# Patient Record
Sex: FEMALE | Race: OTHER | Employment: UNEMPLOYED | URBAN - METROPOLITAN AREA
[De-identification: names, ages, dates, MRNs, and addresses within clinical notes are randomized per-mention and may not be internally consistent; named-entity substitution may affect disease eponyms.]

---

## 2021-11-05 ENCOUNTER — HOSPITAL ENCOUNTER (OUTPATIENT)
Dept: PREADMISSION TESTING | Age: 31
Discharge: HOME | End: 2021-11-05
Payer: MEDICAID

## 2021-11-05 VITALS
RESPIRATION RATE: 16 BRPM | HEIGHT: 62 IN | OXYGEN SATURATION: 98 % | HEART RATE: 78 BPM | TEMPERATURE: 97.7 F | WEIGHT: 238.76 LBS | SYSTOLIC BLOOD PRESSURE: 122 MMHG | DIASTOLIC BLOOD PRESSURE: 56 MMHG | BODY MASS INDEX: 43.94 KG/M2

## 2021-11-05 LAB
ABO + RH BLD: NORMAL
BASOPHILS # BLD: 0.1 K/UL (ref 0–0.1)
BASOPHILS NFR BLD: 1 % (ref 0–1)
BLOOD GROUP ANTIBODIES SERPL: NORMAL
DIFFERENTIAL METHOD BLD: ABNORMAL
EOSINOPHIL # BLD: 0.3 K/UL (ref 0–0.4)
EOSINOPHIL NFR BLD: 4 % (ref 0–7)
ERYTHROCYTE [DISTWIDTH] IN BLOOD BY AUTOMATED COUNT: 17.6 % (ref 11.5–14.5)
HCG SERPL QL: NEGATIVE
HCT VFR BLD AUTO: 38.4 % (ref 35–47)
HGB BLD-MCNC: 11.6 G/DL (ref 11.5–16)
IMM GRANULOCYTES # BLD AUTO: 0 K/UL (ref 0–0.04)
IMM GRANULOCYTES NFR BLD AUTO: 0 % (ref 0–0.5)
LYMPHOCYTES # BLD: 1.9 K/UL (ref 0.8–3.5)
LYMPHOCYTES NFR BLD: 26 % (ref 12–49)
MCH RBC QN AUTO: 24.2 PG (ref 26–34)
MCHC RBC AUTO-ENTMCNC: 30.2 G/DL (ref 30–36.5)
MCV RBC AUTO: 80.2 FL (ref 80–99)
MONOCYTES # BLD: 0.3 K/UL (ref 0–1)
MONOCYTES NFR BLD: 4 % (ref 5–13)
NEUTS SEG # BLD: 4.9 K/UL (ref 1.8–8)
NEUTS SEG NFR BLD: 65 % (ref 32–75)
NRBC # BLD: 0 K/UL (ref 0–0.01)
NRBC BLD-RTO: 0 PER 100 WBC
PLATELET # BLD AUTO: 357 K/UL (ref 150–400)
PMV BLD AUTO: 11.1 FL (ref 8.9–12.9)
RBC # BLD AUTO: 4.79 M/UL (ref 3.8–5.2)
SPECIMEN EXP DATE BLD: NORMAL
WBC # BLD AUTO: 7.5 K/UL (ref 3.6–11)

## 2021-11-05 PROCEDURE — 84703 CHORIONIC GONADOTROPIN ASSAY: CPT

## 2021-11-05 PROCEDURE — 85025 COMPLETE CBC W/AUTO DIFF WBC: CPT

## 2021-11-05 PROCEDURE — 36415 COLL VENOUS BLD VENIPUNCTURE: CPT

## 2021-11-05 PROCEDURE — U0005 INFEC AGEN DETEC AMPLI PROBE: HCPCS

## 2021-11-05 PROCEDURE — 86901 BLOOD TYPING SEROLOGIC RH(D): CPT

## 2021-11-05 RX ORDER — ACETAMINOPHEN 325 MG/1
650 TABLET ORAL
COMMUNITY

## 2021-11-05 NOTE — PERIOP NOTES
S/W Anesthesiologist Dr. Yvonne Sharif to advise patient stating she has 2 \"permanent dermal piercings\" on her face (one stud on each side around dimple area). Per patient the studs can only be removed surgically.          S/W Coreen Stalls at Surgical [facility] to notify of patient allergy to Latex

## 2021-11-05 NOTE — PERIOP NOTES
[address], [address] [facility]   MAIN OR                                  [phone]   MAIN PRE OP                          [phone]                                                                                AMBULATORY PRE OP          [phone]  PRE-ADMISSION TESTING    [phone]   Surgery Date: Thursday November 11, 2021        Is surgery arrival time given by surgeon? YES  NO  If NO, [facility] staff will call you between 3 and 7pm the day before your surgery with your arrival time. (If your surgery is on a Monday, we will call you the Friday before.)    Call [phone] after 7pm Monday-Friday if you did not receive this call. INSTRUCTIONS BEFORE YOUR SURGERY   When You  Arrive Arrive at the [address] [facility] on the day of your surgery  Have your insurance card, photo ID, and any copayment (if needed)   Food   and   Drink NO food or drink after midnight the night before surgery    This means NO water, gum, mints, coffee, juice, etc.  No alcohol (beer, wine, liquor) 24 hours before and after surgery   Medications to   TAKE   Morning of Surgery MEDICATIONS TO TAKE THE MORNING OF SURGERY WITH A SIP OF WATER:    Tylenol if needed   Albuterol inhaler if needed and bring to hospital   Medications  To  STOP      7 days before surgery  Non-Steroidal anti-inflammatory Drugs (NSAID's): for example, Ibuprofen (Advil, Motrin), Naproxen (Aleve)   Aspirin, if taking for pain    Herbal supplements, vitamins, and fish oil   Other: It Works  (Pain medications not listed above, including Tylenol may be taken)   Blood  Thinners  Not applicable.    Bathing Clothing  Jewelry  Valuables      If you shower the morning of surgery, please do not apply anything to your skin (lotions, powders, deodorant, or makeup, especially mascara)   Do not shave or trim anywhere 24 hours before surgery   Wear your hair loose or down; no pony-tails, buns, or metal hair clips   Wear loose, comfortable, clean clothes   Leave money, valuables, and jewelry, including body piercings, at home     Going Home - or Spending the Night  SAME-DAY SURGERY: You must have a responsible adult drive you home and stay with you 24 hours after surgery   Special Instructions COVID test 11/11/2021- You will be notified only if results are positive. Follow all instructions so your surgery wont be cancelled. Please, be on time. If a situation occurs and you are delayed the day of surgery, call  [phone]. If your physical condition changes (like a fever, cold, flu, etc.) call your surgeon. Home medication(s) reviewed and verified via   LIST   VERBAL   during PAT appointment. The patient was contacted by   IN-PERSON  The patient verbalizes understanding of all instructions and   DOES NOT   need reinforcement.

## 2021-11-07 LAB
SARS-COV-2, XPLCVT: NOT DETECTED
SOURCE, COVRS: NORMAL

## 2021-11-10 ENCOUNTER — ANESTHESIA EVENT (OUTPATIENT)
Dept: SURGERY | Age: 31
End: 2021-11-10
Payer: MEDICAID

## 2021-11-11 ENCOUNTER — HOSPITAL ENCOUNTER (OUTPATIENT)
Age: 31
Setting detail: OUTPATIENT SURGERY
Discharge: HOME | End: 2021-11-11
Attending: STUDENT IN AN ORGANIZED HEALTH CARE EDUCATION/TRAINING PROGRAM | Admitting: STUDENT IN AN ORGANIZED HEALTH CARE EDUCATION/TRAINING PROGRAM
Payer: MEDICAID

## 2021-11-11 ENCOUNTER — ANESTHESIA (OUTPATIENT)
Dept: SURGERY | Age: 31
End: 2021-11-11
Payer: MEDICAID

## 2021-11-11 VITALS
BODY MASS INDEX: 45.03 KG/M2 | OXYGEN SATURATION: 91 % | WEIGHT: 244.71 LBS | SYSTOLIC BLOOD PRESSURE: 107 MMHG | HEIGHT: 62 IN | HEART RATE: 71 BPM | RESPIRATION RATE: 16 BRPM | DIASTOLIC BLOOD PRESSURE: 67 MMHG | TEMPERATURE: 97.5 F

## 2021-11-11 LAB — HCG UR QL: NEGATIVE

## 2021-11-11 PROCEDURE — 76210000034 HC AMBSU PH I REC 0.5 TO 1 HR: Performed by: STUDENT IN AN ORGANIZED HEALTH CARE EDUCATION/TRAINING PROGRAM

## 2021-11-11 PROCEDURE — 2709999900 HC NON-CHARGEABLE SUPPLY: Performed by: STUDENT IN AN ORGANIZED HEALTH CARE EDUCATION/TRAINING PROGRAM

## 2021-11-11 PROCEDURE — 88307 TISSUE EXAM BY PATHOLOGIST: CPT

## 2021-11-11 PROCEDURE — 74011250636 HC RX REV CODE- 250/636: Performed by: NURSE ANESTHETIST, CERTIFIED REGISTERED

## 2021-11-11 PROCEDURE — 77030040361 HC SLV COMPR DVT MDII -B

## 2021-11-11 PROCEDURE — 77030003666 HC NDL SPINAL BD -A: Performed by: STUDENT IN AN ORGANIZED HEALTH CARE EDUCATION/TRAINING PROGRAM

## 2021-11-11 PROCEDURE — 77030007304 HC ELECTRD LP RND MEGA -A: Performed by: STUDENT IN AN ORGANIZED HEALTH CARE EDUCATION/TRAINING PROGRAM

## 2021-11-11 PROCEDURE — 77030040922 HC BLNKT HYPOTHRM STRY -A

## 2021-11-11 PROCEDURE — 88305 TISSUE EXAM BY PATHOLOGIST: CPT

## 2021-11-11 PROCEDURE — 76030000001 HC AMB SURG OR TIME 1 TO 1.5: Performed by: STUDENT IN AN ORGANIZED HEALTH CARE EDUCATION/TRAINING PROGRAM

## 2021-11-11 PROCEDURE — 74011000250 HC RX REV CODE- 250: Performed by: STUDENT IN AN ORGANIZED HEALTH CARE EDUCATION/TRAINING PROGRAM

## 2021-11-11 PROCEDURE — 77030018722 HC ELCTRD BALL LEEP UTMD -B: Performed by: STUDENT IN AN ORGANIZED HEALTH CARE EDUCATION/TRAINING PROGRAM

## 2021-11-11 PROCEDURE — 81025 URINE PREGNANCY TEST: CPT

## 2021-11-11 PROCEDURE — 76210000050 HC AMBSU PH II REC 0.5 TO 1 HR: Performed by: STUDENT IN AN ORGANIZED HEALTH CARE EDUCATION/TRAINING PROGRAM

## 2021-11-11 PROCEDURE — 76060000062 HC AMB SURG ANES 1 TO 1.5 HR: Performed by: STUDENT IN AN ORGANIZED HEALTH CARE EDUCATION/TRAINING PROGRAM

## 2021-11-11 RX ORDER — FENTANYL CITRATE 50 UG/ML
INJECTION, SOLUTION INTRAMUSCULAR; INTRAVENOUS AS NEEDED
Status: DISCONTINUED | OUTPATIENT
Start: 2021-11-11 | End: 2021-11-11 | Stop reason: HOSPADM

## 2021-11-11 RX ORDER — ONDANSETRON 2 MG/ML
4 INJECTION INTRAMUSCULAR; INTRAVENOUS AS NEEDED
Status: DISCONTINUED | OUTPATIENT
Start: 2021-11-11 | End: 2021-11-11 | Stop reason: HOSPADM

## 2021-11-11 RX ORDER — SODIUM CHLORIDE, SODIUM LACTATE, POTASSIUM CHLORIDE, CALCIUM CHLORIDE 600; 310; 30; 20 MG/100ML; MG/100ML; MG/100ML; MG/100ML
INJECTION, SOLUTION INTRAVENOUS
Status: DISCONTINUED | OUTPATIENT
Start: 2021-11-11 | End: 2021-11-11 | Stop reason: HOSPADM

## 2021-11-11 RX ORDER — PROPOFOL 10 MG/ML
INJECTION, EMULSION INTRAVENOUS
Status: DISCONTINUED | OUTPATIENT
Start: 2021-11-11 | End: 2021-11-11 | Stop reason: HOSPADM

## 2021-11-11 RX ORDER — SODIUM CHLORIDE, SODIUM LACTATE, POTASSIUM CHLORIDE, CALCIUM CHLORIDE 600; 310; 30; 20 MG/100ML; MG/100ML; MG/100ML; MG/100ML
125 INJECTION, SOLUTION INTRAVENOUS CONTINUOUS
Status: DISCONTINUED | OUTPATIENT
Start: 2021-11-11 | End: 2021-11-11 | Stop reason: HOSPADM

## 2021-11-11 RX ORDER — LIDOCAINE HYDROCHLORIDE 10 MG/ML
0.1 INJECTION, SOLUTION EPIDURAL; INFILTRATION; INTRACAUDAL; PERINEURAL AS NEEDED
Status: DISCONTINUED | OUTPATIENT
Start: 2021-11-11 | End: 2021-11-11 | Stop reason: HOSPADM

## 2021-11-11 RX ORDER — PHENYLEPHRINE HYDROCHLORIDE 10 MG/ML
INJECTION INTRAVENOUS AS NEEDED
Status: DISCONTINUED | OUTPATIENT
Start: 2021-11-11 | End: 2021-11-11 | Stop reason: HOSPADM

## 2021-11-11 RX ORDER — MIDAZOLAM HYDROCHLORIDE 1 MG/ML
INJECTION, SOLUTION INTRAMUSCULAR; INTRAVENOUS AS NEEDED
Status: DISCONTINUED | OUTPATIENT
Start: 2021-11-11 | End: 2021-11-11 | Stop reason: HOSPADM

## 2021-11-11 RX ORDER — FERRIC SUBSULFATE 20-22G/100
SOLUTION, NON-ORAL MISCELLANEOUS AS NEEDED
Status: DISCONTINUED | OUTPATIENT
Start: 2021-11-11 | End: 2021-11-11 | Stop reason: HOSPADM

## 2021-11-11 RX ORDER — HYDROMORPHONE HYDROCHLORIDE 1 MG/ML
.5-1 INJECTION, SOLUTION INTRAMUSCULAR; INTRAVENOUS; SUBCUTANEOUS
Status: DISCONTINUED | OUTPATIENT
Start: 2021-11-11 | End: 2021-11-11 | Stop reason: HOSPADM

## 2021-11-11 RX ORDER — DEXAMETHASONE SODIUM PHOSPHATE 4 MG/ML
INJECTION, SOLUTION INTRA-ARTICULAR; INTRALESIONAL; INTRAMUSCULAR; INTRAVENOUS; SOFT TISSUE AS NEEDED
Status: DISCONTINUED | OUTPATIENT
Start: 2021-11-11 | End: 2021-11-11 | Stop reason: HOSPADM

## 2021-11-11 RX ADMIN — FENTANYL CITRATE 50 MCG: 50 INJECTION, SOLUTION INTRAMUSCULAR; INTRAVENOUS at 14:14

## 2021-11-11 RX ADMIN — PROPOFOL 50 MG: 10 INJECTION, EMULSION INTRAVENOUS at 15:06

## 2021-11-11 RX ADMIN — PROPOFOL 120 MCG/KG/MIN: 10 INJECTION, EMULSION INTRAVENOUS at 14:05

## 2021-11-11 RX ADMIN — SODIUM CHLORIDE, POTASSIUM CHLORIDE, SODIUM LACTATE AND CALCIUM CHLORIDE: 600; 310; 30; 20 INJECTION, SOLUTION INTRAVENOUS at 15:10

## 2021-11-11 RX ADMIN — DEXAMETHASONE SODIUM PHOSPHATE 4 MG: 4 INJECTION, SOLUTION INTRAMUSCULAR; INTRAVENOUS at 14:21

## 2021-11-11 RX ADMIN — PROPOFOL 40 MG: 10 INJECTION, EMULSION INTRAVENOUS at 14:52

## 2021-11-11 RX ADMIN — PHENYLEPHRINE HYDROCHLORIDE 100 MCG: 10 INJECTION INTRAVENOUS at 14:27

## 2021-11-11 RX ADMIN — PHENYLEPHRINE HYDROCHLORIDE 100 MCG: 10 INJECTION INTRAVENOUS at 14:57

## 2021-11-11 RX ADMIN — MIDAZOLAM 2 MG: 1 INJECTION, SOLUTION INTRAMUSCULAR; INTRAVENOUS at 13:58

## 2021-11-11 RX ADMIN — PROPOFOL 20 MG: 10 INJECTION, EMULSION INTRAVENOUS at 15:08

## 2021-11-11 RX ADMIN — FENTANYL CITRATE 150 MCG: 50 INJECTION, SOLUTION INTRAMUSCULAR; INTRAVENOUS at 13:59

## 2021-11-11 RX ADMIN — PHENYLEPHRINE HYDROCHLORIDE 100 MCG: 10 INJECTION INTRAVENOUS at 14:49

## 2021-11-11 RX ADMIN — PROPOFOL 30 MG: 10 INJECTION, EMULSION INTRAVENOUS at 15:00

## 2021-11-11 RX ADMIN — SODIUM CHLORIDE, POTASSIUM CHLORIDE, SODIUM LACTATE AND CALCIUM CHLORIDE: 600; 310; 30; 20 INJECTION, SOLUTION INTRAVENOUS at 13:58

## 2021-11-11 RX ADMIN — PROPOFOL 50 MG: 10 INJECTION, EMULSION INTRAVENOUS at 14:07

## 2021-11-11 RX ADMIN — PROPOFOL 50 MG: 10 INJECTION, EMULSION INTRAVENOUS at 15:03

## 2021-11-11 RX ADMIN — FENTANYL CITRATE 50 MCG: 50 INJECTION, SOLUTION INTRAMUSCULAR; INTRAVENOUS at 14:10

## 2021-11-11 RX ADMIN — PHENYLEPHRINE HYDROCHLORIDE 100 MCG: 10 INJECTION INTRAVENOUS at 14:38

## 2021-11-11 RX ADMIN — PROPOFOL 50 MG: 10 INJECTION, EMULSION INTRAVENOUS at 14:11

## 2021-11-11 RX ADMIN — PROPOFOL 40 MG: 10 INJECTION, EMULSION INTRAVENOUS at 14:55

## 2021-11-11 NOTE — ANESTHESIA PREPROCEDURE EVALUATION
Anesthetic History   No history of anesthetic complications            Review of Systems / Medical History  Patient summary reviewed, nursing notes reviewed and pertinent labs reviewed    Pulmonary            Asthma : well controlled       Neuro/Psych   Within defined limits           Cardiovascular  Within defined limits                Exercise tolerance: >4 METS  Comments: Not on beta blocker   GI/Hepatic/Renal  Within defined limits              Endo/Other      Hypothyroidism (no longer taking meds)  Morbid obesity     Other Findings              Physical Exam    Airway  Mallampati: II  TM Distance: 4 - 6 cm  Neck ROM: normal range of motion   Mouth opening: Normal     Cardiovascular  Regular rate and rhythm,  S1 and S2 normal,  no murmur, click, rub, or gallop  Rhythm: regular  Rate: normal         Dental  No notable dental hx       Pulmonary  Breath sounds clear to auscultation               Abdominal  GI exam deferred       Other Findings            Anesthetic Plan    ASA: 2  Anesthesia type: MAC          Induction: Intravenous  Anesthetic plan and risks discussed with: Patient

## 2021-11-11 NOTE — ANESTHESIA POSTPROCEDURE EVALUATION
Procedure(s):  LOOP ELECTRODE EXCISION PROCEDURE OF CERVIX (LEEP). MAC    Anesthesia Post Evaluation      Multimodal analgesia: multimodal analgesia used between 6 hours prior to anesthesia start to PACU discharge  Patient location during evaluation: bedside  Patient participation: complete - patient participated  Level of consciousness: awake  Pain management: adequate  Airway patency: patent  Anesthetic complications: no  Cardiovascular status: acceptable  Respiratory status: acceptable  Hydration status: acceptable        INITIAL Post-op Vital signs:   Vitals Value Taken Time   /77 11/11/21 1535   Temp 36.4 °C (97.5 °F) 11/11/21 1520   Pulse 65 11/11/21 1538   Resp 21 11/11/21 1538   SpO2 96 % 11/11/21 1538   Vitals shown include unvalidated device data.

## 2021-11-11 NOTE — DISCHARGE INSTRUCTIONS
DISCHARGE SUMMARY from your Nurse      PATIENT INSTRUCTIONS    After general anesthesia or intravenous sedation, for 24 hours or while taking prescription Narcotics:  · Limit your activities  · Do not drive and operate hazardous machinery  · Do not make important personal or business decisions  · Do  not drink alcoholic beverages  · If you have not urinated within 8 hours after discharge, please contact your surgeon on call. Report the following to your surgeon:  · Excessive pain, swelling, redness or odor of or around the surgical area  · Temperature over 100.5  · Nausea and vomiting lasting longer than 4 hours or if unable to take medications  · Any signs of decreased circulation or nerve impairment to extremity: change in color, persistent  numbness, tingling, coldness or increase pain  · Any questions      GOOD HELP TO FIGHT AN INFECTION  Here are a few tip to help reduce the chance of getting an infection after surgery:   Wash Your Hands   Good handwashing is the most important thing you and your caregiver can do.  Wash before and after caring for any wounds. Dry your hand with a clean towel.  Wash with soap and water for at least 20 seconds. A TIP: sing the \"Happy Birthday\" song through one time while washing to help with the timing.  Use a hand  in between washings.  Shower   When your surgeon says it is OK to take a shower, use a new bar of antibacterial soap (if that is what you use, and keep that bar of soap ONLY for your use), or antibacterial body wash.  Use a clean wash cloth or sponge when you bathe.  Dry off with a clean towel  after every bath - be careful around any wounds, skin staples, sutures or surgical glue over/on wounds.  Do not enter swimming pools, hot tubs, lakes, rivers and/or ocean until wounds are healed and your doctor/surgeon says it is OK.  Use Clean Sheets   Sleep on freshly laundered sheets after your surgery.    Keep the surgery site covered with a clean, dry bandage (if instructed to do so). If the bandage becomes soiled, reapply a new, dry, clean bandage.  Do not allow pets to sleep with you while your wound is healing.  Lifestyle Modification and Controlling Your Blood Sugar   Smoking slows wound healing. Stop smoking and limit exposure to second-hand smoke.  High blood sugar slows wound healing. Eat a well-balanced diet to provide proper nutrition while healing   Monitor your blood sugar (if you are a diabetic) and take your medications as you are suppose to so you can control you blood sugar after surgery. COUGH AND DEEP BREATHE    Breathing deeply and coughing are very important exercises to do after surgery. Deep breathing and coughing open the little air tubes and air sacks in your lungs. You take deep breaths every day. You may not even notice - it is just something you do when you sigh or yawn. It is a natural exercise you do to keep these air passages open. After surgery, take deep breaths and cough, on purpose. DIRECTIONS:  · Take 10 to 15 slow deep breaths every hour while awake. · Breathe in deeply, and hold it for 2 seconds. · Exhale slowly through puckered lips, like blowing up a balloon. · After every 4th or 5th deep breath, hug your pillow to your chest or belly and give a hard, deep cough. Yes, it will probably hurt. But doing this exercise is a very important part of healing after surgery. Take your pain medicine to help you do this exercise without too much pain. Coughing and deep breathing help prevent bronchitis and pneumonia after surgery. If you had chest or belly surgery, use a pillow as a \"hug jeana\" and hold it tightly to your chest or belly when you cough. ANKLE PUMPS    Ankle pumps increase the circulation of oxygenated blood to your lower extremities and decrease your risk for circulation problems such as blood clots.  They also stretch the muscles, tendons and ligaments in your foot and ankle, and prevent joint contracture in the ankle and foot, especially after surgeries on the legs. It is important to do ankle pump exercises regularly after surgery because immobility increases your risk for developing a blood clot. Your doctor may also have you take an Aspirin for the next few days as well. If your doctor did not ask you to take an Aspirin, consult with him before starting Aspirin therapy on your own. The exercise is quite simple. · Slowly point your foot forward, feeling the muscles on the top of your lower leg stretch, and hold this position for 5 seconds. · Next, pull your foot back toward you as far as possible, stretching the calf muscles, and hold that position for 5 seconds. · Repeat with the other foot. · Perform 10 repetitions every hour while awake for both ankles if possible (down and then up with the foot once is one repetition). You should feel gentle stretching of the muscles in your lower leg when doing this exercise. If you feel pain, or your range of motion is limited, don't push too hard. Only go the limit your joint and muscles will let you go. If you have increasing pain, progressively worsening leg warmth or swelling, STOP the exercise and call your doctor. These are general instructions for a healthy lifestyle:    *   Please give a list of your current medications to your Primary Care Provider. *   Please update this list whenever your medications are discontinued, doses are changed, or new medications (including over-the-counter products) are added. *   Please carry medication information at all times in case of emergency situations. About Smoking  No smoking / No tobacco products  Avoid exposure to second hand smoke     Surgeon General's Warning:  Quitting smoking now greatly reduces serious risk to your health.     Obesity, smoking, and sedentary lifestyle greatly increases your risk for illness and disease. A healthy diet, regular physical exercise & weight monitoring are important for maintaining a healthy lifestyle. Congestive Heart Failure  You may be retaining fluid if you have a history of heart failure or if you experience any of the following symptoms:  Weight gain of 3 pounds or more overnight or 5 pounds in a week, increased swelling in your hands or feet or shortness of breath while lying flat in bed. Please call your doctor as soon as you notice any of these symptoms; do not wait until your next office visit. Recognize signs and symptoms of STROKE:  F -  Face looks uneven  A -  Arms unable to move or move evenly  S -  Speech slurred or non-existent  T -  Time-call 911 as soon as signs and symptoms begin-DO NOT go          back to bed or wait to see if you get better-TIME IS BRAIN. Warning Signs of HEART ATTACK   Call 911 if you have these symptoms:     Chest discomfort. Most heart attacks involve discomfort in the center of the chest that lasts more than a few minutes, or that goes away and comes back. It can feel like uncomfortable pressure, squeezing, fullness, or pain.  Discomfort in other areas of the upper body. Symptoms can include pain or discomfort in one or both arms, the back, neck, jaw, or stomach.  Shortness of breath with or without chest discomfort.  Other signs may include breaking out in a cold sweat, nausea, or lightheadedness. Don't wait more than five minutes to call 911 - MINUTES MATTER! Fast action can save your life. Calling 911 is almost always the fastest way to get lifesaving treatment. Emergency Medical Services staff can begin treatment when they arrive -- up to an hour sooner than if someone gets to the hospital by car. Learning About Coronavirus [phone])  Coronavirus [phone]): Overview  What is coronavirus (COVID-19)? The coronavirus disease (COVID-19) is caused by a virus.  It is an illness that was [facility]. It has since spread worldwide. The virus can cause fever, cough, and trouble breathing. In severe cases, it can cause pneumonia and make it hard to breathe without help. It can cause death. Coronaviruses are a large group of viruses. They cause the common cold. They also cause more serious illnesses like Middle East respiratory syndrome (MERS) and severe acute respiratory syndrome (SARS). COVID-19 is caused by a novel coronavirus. That means it's a new type that has not been seen in people before. This virus spreads person-to-person through droplets from coughing and sneezing. It can also spread when you are close to someone who is infected. And it can spread when you touch something that has the virus on it, such as a doorknob or a tabletop. What can you do to protect yourself from coronavirus (COVID-19)? The best way to protect yourself from getting sick is to:  · Avoid areas where there is an outbreak. · Avoid contact with people who may be infected. · Wash your hands often with soap or alcohol-based hand sanitizers. · Avoid crowds and try to stay at least 6 feet away from other people. · Wash your hands often, especially after you cough or sneeze. Use soap and water, and scrub for at least 20 seconds. If soap and water aren't available, use an alcohol-based hand . · Avoid touching your mouth, nose, and eyes. What can you do to avoid spreading the virus to others? To help avoid spreading the virus to others:  · Cover your mouth with a tissue when you cough or sneeze. Then throw the tissue in the trash. · Use a disinfectant to clean things that you touch often. · Stay home if you are sick or have been exposed to the virus. Don't go to school, work, or public areas. And don't use public transportation. · If you are sick:  ? Leave your home only if you need to get medical care. But call the doctor's office first so they know you're coming.  And wear a face mask, if you have one.  ? If you have a face mask, wear it whenever you're around other people. It can help stop the spread of the virus when you cough or sneeze. ? Clean and disinfect your home every day. Use household  and disinfectant wipes or sprays. Take special care to clean things that you grab with your hands. These include doorknobs, remote controls, phones, and handles on your refrigerator and microwave. And don't forget countertops, tabletops, bathrooms, and computer keyboards. When to call for help  Call 911 anytime you think you may need emergency care. For example, call if:  · You have severe trouble breathing. (You can't talk at all.)  · You have constant chest pain or pressure. · You are severely dizzy or lightheaded. · You are confused or can't think clearly. · Your face and lips have a blue color. · You pass out (lose consciousness) or are very hard to wake up. Call your doctor now if you develop symptoms such as:  · Shortness of breath. · Fever. · Cough. If you need to get care, call ahead to the doctor's office for instructions before you go. Make sure you wear a face mask, if you have one, to prevent exposing other people to the virus. Where can you get the latest information? The following health organizations are tracking and studying this virus. Their websites contain the most up-to-date information. Raina Dakin also learn what to do if you think you may have been exposed to the virus. · U.S. Centers for Disease Control and Prevention (CDC): The CDC provides updated news about the disease and travel advice. The website also tells you how to prevent the spread of infection. www.cdc.gov  · World Health Organization [facility]): WHO offers information about the virus outbreaks. WHO also has travel advice. www.who.int  Current as of: April 1, 2020               Content Version: 12.4  © 9160-1880 [employer].    Care instructions adapted under license by your healthcare professional. If you have questions about a medical condition or this instruction, always ask your healthcare professional. [place] [address] any warranty or liability for your use of this information. The discharge information has been reviewed with the {PATIENT PARENT GUARDIAN:72596}. Any questions and concerns from the {PATIENT PARENT GUARDIAN:87806} have been addressed. The {PATIENT PARENT GUARDIAN:68025} verbalized understanding. CONTENTS FOUND IN YOUR DISCHARGE ENVELOPE:  [x]     Surgeon and Hospital Discharge Instructions  [x]     [facility] Care Provider Card  []     Medication & Side Effect Guide            (your newly prescribed medications have been marked/highlighted showing the most common side effects from   the classes of drugs on your prescriptions)  []     Medication Prescription(s) x *** ( [] These have been sent electronically to your pharmacy by your surgeon,   - OR -       your surgeon has already provided these to you during a previous/pre-op office visit)  []     [address]  []     Physical Therapy Prescription  []     Follow-up Appointment Cards  []     Surgery-related Pictures/Media  []     Pain block and/or block with On-Q Catheter from Anesthesia Service (information included in your instructions above)  []     Medical device information sheets/pamphlets from their    []     School/work excuse note. []     /parent work excuse note. The following personal items collected during your admission are returned to you:   Dental Appliance:    Vision:    Hearing Aid:    Ivan Mcconnellorn: Ivan Marks: Body Piercing  Clothing: Clothing: Footwear, Shirt, Jacket/Coat, Pants, Undergarments, Socks  Other Valuables:  Other Valuables: Cell Phone, Purse  Valuables sent to safe:

## 2021-11-11 NOTE — BRIEF OP NOTE
Brief Postoperative Note    Patient: Katia Vallejo  YOB: 1990  MRN: [id number]    Date of Procedure: 11/11/2021     Pre-Op Diagnosis: MODERATE CERVICAL DYSPLASIA - JONATHAN 2    Post-Op Diagnosis: Same as preoperative diagnosis.       Procedure(s):  LOOP ELECTRODE EXCISION PROCEDURE OF CERVIX (LEEP)    Surgeon(s):  Marcus Metcalf MD    Surgical Assistant: None    Anesthesia: General     Estimated Blood Loss (mL): less than 326     Complications: None    Specimens:   ID Type Source Tests Collected by Time Destination   1 : posterior LEEP specimen  Preservative Cervix  Marcus Metcalf MD 11/11/2021 1438 Pathology   2 : ANTERIOR LEEP SPECIMEN  Preservative Cervix  Marcus Metcalf MD 11/11/2021 1442 Pathology        Implants: * No implants in log *    Drains: * No LDAs found *    Findings: Normal appearing cervix after application of Lugol's    Electronically Signed by Donny Ibrahim MD on 11/11/2021 at 3:01 PM

## 2021-11-11 NOTE — OP NOTES
Operative Report      Date of surgery: 11/11/2021    Preoperative Diagnosis: CIN2    Postoperative Diagnosis: same     Procedure: loop electrosurgical excisional procedure    Findings: Urine pregnancy negative, normal appearing cervix with no abnormalities noted with application of Lugol's    Specimen(s): 1. posterior LEEP cervical biopsy, 2. Anterior LEEP cervical biopsy     Surgeon: Gary Copeland MD     Anesthesia: lidocaine 1%, IV sedation    EBL: 100 cc    IVF: 1000 cc    UOP: 50 cc via straight cath at the end of the case, clear     Complications: none       Procedure: The patient was taken to the operating room after all consents were signed. She was given IV anesthesia and once that was deemed to be adequate, she was placed in dorsal lithotomy. The large coated Graves speculum was placed in the vagina, cervix clearly visaulized. Cervix appeared normal. A 2 point paracervical block  at 4 o'clock and 8 o'clock was performed. Lugols solution was painted along the entire cervix and vaginal wall with findings as above. 20 mm x 10 mm loop electrode was selected to remove the the most distal portion of the cervix from posterior to anterior  using cut setting. The specimen was handed off in two parts - anterior and posterior. The margins were cauterized with a roller ball 3mm from the cut edge and the LEEP bed was fulgurated with the roller-ball Bovie . Hemostasis was noted with asistance of Monsell's. All instruments were removed from the vagina. Speculum was removed under direct visualization and no damage was noted. The patient tolerated the procedure well. No antibiotics were used for this procedure.        Gary Copeland MD  [facility]

## 2022-01-03 ENCOUNTER — VIRTUAL VISIT (OUTPATIENT)
Dept: ENDOCRINOLOGY | Age: 32
End: 2022-01-03
Payer: MEDICAID

## 2022-01-03 DIAGNOSIS — R63.5 WEIGHT GAIN: ICD-10-CM

## 2022-01-03 DIAGNOSIS — E03.9 PRIMARY HYPOTHYROIDISM: Primary | ICD-10-CM

## 2022-01-03 DIAGNOSIS — E66.01 MORBID OBESITY (HCC): ICD-10-CM

## 2022-01-03 PROCEDURE — 99204 OFFICE O/P NEW MOD 45 MIN: CPT | Performed by: INTERNAL MEDICINE

## 2022-01-03 NOTE — PROGRESS NOTES
Rocio Whitfield is a 28 y.o. female here for   Chief Complaint   Patient presents with    New Patient     referred for Thyroid       1. Have you been to the ER, urgent care clinic since your last visit? Hospitalized since your last visit? -n/a    2. Have you seen or consulted any other health care providers outside of the [address] [place] [address] since your last visit?   Include any pap smears or colon screening.-n/a

## 2022-01-03 NOTE — PROGRESS NOTES
Order placed for pt,  per Verbal Order with read back from Dr Gerardo Granados 1/3/2022  Mailed 01/03/2022.

## 2022-01-03 NOTE — PROGRESS NOTES
Surya Brock MD      Patient Information  Date:1/3/2022  Name : Natali Lopes 28 y.o.     YOB: 1990         Referred by: Sol Doherty MD       Chief Complaint   Patient presents with    New Patient     referred for Thyroid     Pursuant to the emergency declaration under the [address] [place] [address] [facility] authority and the [employer] Act, this Virtual  Visit was conducted, with patient's consent, to reduce the patient's risk of exposure to COVID-19 . Patient  is aware that this is a billable encounter and is responsible for copays/deductibles       Services were provided through a video synchronous discussion virtually to substitute for in-person clinic visit. Place of service: Provider : Office  Patient: Home  History of Present Illness: Natali Lopes is a 28 y.o. female here to establish care. Was dx with hypothyroidism  when she was 16years old, not on medications now and no recent thyroid blood test.  She is not able to lose weight . She was not on Thyroid  medication during last 3 pregnancies, last pregnancy was  . She was on LT4 during her first pregnancy  Regular cycles ,no infertility , no excess hair growth    Thick neck,sometimes has dysphagia     Weight gain - off sodas , doing Keto shakes , walking  With no weight loss    Family hx thyroid ,DM ,obesity    Past Medical History:   Diagnosis Date    Abnormal Papanicolaou smear of cervix     colpo    Acquired hypothyroidism     HX hypothyroidism since 14, not on medication because she cannot find MD    Anxiety and depression     Asthma     Borderline schizophrenia ([facility] [address]     Gastritis      Past Surgical History:   Procedure Laterality Date    HX LEEP PROCEDURE  2021    HX WISDOM TEETH EXTRACTION      [place]  DELIVERY ONLY       - , , ,      Current Outpatient Medications   Medication Sig    acetaminophen (TylenoL) 325 mg tablet Take 650 mg by mouth every six (6) hours as needed for Pain. (Patient not taking: Reported on 1/3/2022)    ALBUTEROL IN Take 1-2 Puffs by inhalation as needed. (Patient not taking: Reported on 1/3/2022)     No current facility-administered medications for this visit. Allergies   Allergen Reactions    Latex Hives    Advil [Ibuprofen] Hives    Apple Hives         Review of Systems: Per HPI    Physical Examination:  unknown if currently breastfeeding. There is no height or weight on file to calculate BMI. - General: pleasant, no distress, good eye contact  - HEENT: no exophthalmos, no periorbital edema, EOMI  - Neck: No visible thyromegaly  - RS: Normal respiratory effort  - Musculoskeletal: no tremors  - Neurological: alert and oriented  - Psychiatric: normal mood and affect  - Skin: Normal color    Data Reviewed:     Lab Results   Component Value Date/Time    Glucose 102 (H) 05/19/2015 11:10 PM    Creatinine 0.68 05/19/2015 11:10 PM      Lab Results   Component Value Date/Time    GFR est non-AA >60 05/19/2015 11:10 PM    GFR est AA >60 05/19/2015 11:10 PM    Creatinine 0.68 05/19/2015 11:10 PM    BUN 10 05/19/2015 11:10 PM    Sodium 139 05/19/2015 11:10 PM    Potassium 3.8 05/19/2015 11:10 PM    Chloride 105 05/19/2015 11:10 PM    CO2 22 05/19/2015 11:10 PM       Assessment/Plan:     1. Primary hypothyroidism    2. Morbid obesity ([address]    3. Weight gain      Primary hypothyroidism: Labs soon  Discussed various causes of weight gain  TSH,FT4,TPO , testosterone,SHBG , Cortisol ,ACTH AM labs   Continue lifestyle changes        Thank you for allowing me to participate in the care of this patient. Zoila Vyas MD      There are no Patient Instructions on file for this visit. Follow-up and Dispositions    · Return for AM labs soon. Patient /caregiver verbalized understanding .   Voice-recognition software was used to generate this report, which may result in some phonetic-based errors in the grammar and contents. Even though attempts were made to correct all the mistakes, some may have been missed and remained in the body of the report.

## 2022-01-04 ENCOUNTER — TELEPHONE (OUTPATIENT)
Dept: ENDOCRINOLOGY | Age: 32
End: 2022-01-04

## 2022-09-12 ENCOUNTER — HOSPITAL ENCOUNTER (EMERGENCY)
Age: 32
Discharge: HOME | End: 2022-09-13
Attending: EMERGENCY MEDICINE
Payer: MEDICAID

## 2022-09-12 ENCOUNTER — APPOINTMENT (OUTPATIENT)
Dept: ULTRASOUND IMAGING | Age: 32
End: 2022-09-12
Attending: EMERGENCY MEDICINE
Payer: MEDICAID

## 2022-09-12 VITALS
OXYGEN SATURATION: 100 % | WEIGHT: 200 LBS | SYSTOLIC BLOOD PRESSURE: 117 MMHG | HEART RATE: 78 BPM | DIASTOLIC BLOOD PRESSURE: 65 MMHG | TEMPERATURE: 98.1 F | HEIGHT: 62 IN | BODY MASS INDEX: 36.8 KG/M2 | RESPIRATION RATE: 16 BRPM

## 2022-09-12 DIAGNOSIS — R10.30 LOWER ABDOMINAL PAIN: Primary | ICD-10-CM

## 2022-09-12 DIAGNOSIS — Z3A.01 LESS THAN 8 WEEKS GESTATION OF PREGNANCY: ICD-10-CM

## 2022-09-12 LAB
ALBUMIN SERPL-MCNC: 3.7 G/DL (ref 3.5–5)
ALBUMIN/GLOB SERPL: 0.9 {RATIO} (ref 1.1–2.2)
ALP SERPL-CCNC: 60 U/L (ref 45–117)
ALT SERPL-CCNC: 14 U/L (ref 12–78)
ANION GAP SERPL CALC-SCNC: 6 MMOL/L (ref 5–15)
APPEARANCE UR: CLEAR
AST SERPL-CCNC: 10 U/L (ref 15–37)
BACTERIA URNS QL MICRO: NEGATIVE /HPF
BASOPHILS # BLD: 0.1 K/UL (ref 0–0.1)
BASOPHILS NFR BLD: 1 % (ref 0–1)
BILIRUB SERPL-MCNC: 0.5 MG/DL (ref 0.2–1)
BILIRUB UR QL: NEGATIVE
BUN SERPL-MCNC: 7 MG/DL (ref 6–20)
BUN/CREAT SERPL: 12 (ref 12–20)
CALCIUM SERPL-MCNC: 9 MG/DL (ref 8.5–10.1)
CHLORIDE SERPL-SCNC: 109 MMOL/L (ref 97–108)
CO2 SERPL-SCNC: 22 MMOL/L (ref 21–32)
COLOR UR: ABNORMAL
CREAT SERPL-MCNC: 0.58 MG/DL (ref 0.55–1.02)
DIFFERENTIAL METHOD BLD: ABNORMAL
EOSINOPHIL # BLD: 0.2 K/UL (ref 0–0.4)
EOSINOPHIL NFR BLD: 2 % (ref 0–7)
EPITH CASTS URNS QL MICRO: ABNORMAL /LPF
ERYTHROCYTE [DISTWIDTH] IN BLOOD BY AUTOMATED COUNT: 19.7 % (ref 11.5–14.5)
GLOBULIN SER CALC-MCNC: 4 G/DL (ref 2–4)
GLUCOSE SERPL-MCNC: 90 MG/DL (ref 65–100)
GLUCOSE UR STRIP.AUTO-MCNC: NEGATIVE MG/DL
HCG SERPL-ACNC: ABNORMAL MIU/ML (ref 0–6)
HCT VFR BLD AUTO: 34.8 % (ref 35–47)
HGB BLD-MCNC: 11 G/DL (ref 11.5–16)
HGB UR QL STRIP: NEGATIVE
HYALINE CASTS URNS QL MICRO: ABNORMAL /LPF (ref 0–2)
IMM GRANULOCYTES # BLD AUTO: 0 K/UL (ref 0–0.04)
IMM GRANULOCYTES NFR BLD AUTO: 0 % (ref 0–0.5)
KETONES UR QL STRIP.AUTO: >80 MG/DL
LEUKOCYTE ESTERASE UR QL STRIP.AUTO: NEGATIVE
LIPASE SERPL-CCNC: 126 U/L (ref 73–393)
LYMPHOCYTES # BLD: 2.4 K/UL (ref 0.8–3.5)
LYMPHOCYTES NFR BLD: 23 % (ref 12–49)
MCH RBC QN AUTO: 23.2 PG (ref 26–34)
MCHC RBC AUTO-ENTMCNC: 31.6 G/DL (ref 30–36.5)
MCV RBC AUTO: 73.4 FL (ref 80–99)
MONOCYTES # BLD: 0.6 K/UL (ref 0–1)
MONOCYTES NFR BLD: 6 % (ref 5–13)
NEUTS SEG # BLD: 7.1 K/UL (ref 1.8–8)
NEUTS SEG NFR BLD: 68 % (ref 32–75)
NITRITE UR QL STRIP.AUTO: NEGATIVE
NRBC # BLD: 0 K/UL (ref 0–0.01)
NRBC BLD-RTO: 0 PER 100 WBC
PH UR STRIP: 6.5 [PH] (ref 5–8)
PLATELET # BLD AUTO: 336 K/UL (ref 150–400)
PMV BLD AUTO: 10.9 FL (ref 8.9–12.9)
POTASSIUM SERPL-SCNC: 3.2 MMOL/L (ref 3.5–5.1)
PROT SERPL-MCNC: 7.7 G/DL (ref 6.4–8.2)
PROT UR STRIP-MCNC: ABNORMAL MG/DL
RBC # BLD AUTO: 4.74 M/UL (ref 3.8–5.2)
RBC #/AREA URNS HPF: ABNORMAL /HPF (ref 0–5)
SODIUM SERPL-SCNC: 137 MMOL/L (ref 136–145)
SP GR UR REFRACTOMETRY: 1.02 (ref 1–1.03)
UR CULT HOLD, URHOLD: NORMAL
UROBILINOGEN UR QL STRIP.AUTO: 1 EU/DL (ref 0.2–1)
WBC # BLD AUTO: 10.4 K/UL (ref 3.6–11)
WBC URNS QL MICRO: ABNORMAL /HPF (ref 0–4)

## 2022-09-12 PROCEDURE — 80053 COMPREHEN METABOLIC PANEL: CPT

## 2022-09-12 PROCEDURE — 84702 CHORIONIC GONADOTROPIN TEST: CPT

## 2022-09-12 PROCEDURE — 36415 COLL VENOUS BLD VENIPUNCTURE: CPT

## 2022-09-12 PROCEDURE — 99284 EMERGENCY DEPT VISIT MOD MDM: CPT

## 2022-09-12 PROCEDURE — 83690 ASSAY OF LIPASE: CPT

## 2022-09-12 PROCEDURE — 74011250636 HC RX REV CODE- 250/636: Performed by: EMERGENCY MEDICINE

## 2022-09-12 PROCEDURE — 96360 HYDRATION IV INFUSION INIT: CPT

## 2022-09-12 PROCEDURE — 76801 OB US < 14 WKS SINGLE FETUS: CPT

## 2022-09-12 PROCEDURE — 74011000250 HC RX REV CODE- 250: Performed by: EMERGENCY MEDICINE

## 2022-09-12 PROCEDURE — 85025 COMPLETE CBC W/AUTO DIFF WBC: CPT

## 2022-09-12 PROCEDURE — 96361 HYDRATE IV INFUSION ADD-ON: CPT

## 2022-09-12 PROCEDURE — 76817 TRANSVAGINAL US OBSTETRIC: CPT

## 2022-09-12 PROCEDURE — 81001 URINALYSIS AUTO W/SCOPE: CPT

## 2022-09-12 RX ORDER — SODIUM CHLORIDE 0.9 % (FLUSH) 0.9 %
5-40 SYRINGE (ML) INJECTION AS NEEDED
Status: DISCONTINUED | OUTPATIENT
Start: 2022-09-12 | End: 2022-09-13 | Stop reason: HOSPADM

## 2022-09-12 RX ORDER — SODIUM CHLORIDE 0.9 % (FLUSH) 0.9 %
5-40 SYRINGE (ML) INJECTION EVERY 8 HOURS
Status: DISCONTINUED | OUTPATIENT
Start: 2022-09-12 | End: 2022-09-13 | Stop reason: HOSPADM

## 2022-09-12 RX ADMIN — SODIUM CHLORIDE, PRESERVATIVE FREE 10 ML: 5 INJECTION INTRAVENOUS at 23:00

## 2022-09-12 RX ADMIN — SODIUM CHLORIDE 1000 ML: 9 INJECTION, SOLUTION INTRAVENOUS at 23:00

## 2022-09-12 RX ADMIN — SODIUM CHLORIDE 1000 ML: 9 INJECTION, SOLUTION INTRAVENOUS at 20:47

## 2022-09-12 RX ADMIN — SODIUM CHLORIDE, PRESERVATIVE FREE 10 ML: 5 INJECTION INTRAVENOUS at 20:54

## 2022-09-13 NOTE — ED PROVIDER NOTES
History of hypothyroidism, anxiety, depression, asthma, borderline schizophrenia, gastritis. She presents with complaints of a 3-month history of lower abdominal pain, low back pain, and bilateral lower extremity pain. The pains have been constant. No exacerbating or relieving factors. She has tried Tylenol without relief. She denies fever, nausea, vomiting, diarrhea, urinary symptoms. Her appetite has been normal.  She states that she has been moving her bowels normally. Past Medical History:   Diagnosis Date    Abnormal Papanicolaou smear of cervix     colpo    Acquired hypothyroidism     HX hypothyroidism since 14, not on medication because she cannot find MD    Anxiety and depression     Asthma     Borderline schizophrenia ([facility] [address]     Gastritis        Past Surgical History:   Procedure Laterality Date    HX LEEP PROCEDURE  2021    HX WISDOM TEETH EXTRACTION      [place]  DELIV[place] ONLY       - , , ,          No family history on file. Social History     Socioeconomic History    Marital status: SINGLE     Spouse name: Not on file    Number of children: Not on file    Years of education: Not on file    Highest education level: Not on file   Occupational History    Not on file   Tobacco Use    Smoking status: Former     Packs/day: 2.00     Years: 17.00     Pack years: 34.00     Types: Cigarettes     Quit date:      Years since quittin.6    Smokeless tobacco: Never   Vaping Use    Vaping Use: Every day    Substances: THC, CBD    Devices: Disposable   Substance and Sexual Activity    Alcohol use:  Yes     Alcohol/week: 2.0 - 3.0 standard drinks     Types: 2 Shots of liquor per week    Drug use: Never    Sexual activity: Yes     Partners: Male     Birth control/protection: None   Other Topics Concern    Not on file   Social History Narrative    Not on file     Social Determinants of Health     Financial Resource Strain: Not on file   Food Insecurity: Not on file Transportation Needs: Not on file   Physical Activity: Not on file   Stress: Not on file   Social Connections: Not on file   Intimate Partner Violence: Not on file   Housing Stability: Not on file         ALLERGIES: Latex, Advil [ibuprofen], and Apple    Review of Systems   All other systems reviewed and are negative. Vitals:    09/12/22 2008   BP: 123/75   Pulse: 75   Resp: 16   Temp: 98.1 °F (36.7 °C)   SpO2: 99%   Weight: 90.7 kg (200 lb)   Height: 5' 2\" (1.575 m)            Physical Exam  Vitals and nursing note reviewed. Constitutional:       Appearance: She is well-developed. HENT:      Head: Normocephalic and atraumatic. Eyes:      Conjunctiva/sclera: Conjunctivae normal.   Neck:      Trachea: No tracheal deviation. Cardiovascular:      Rate and Rhythm: Normal rate and regular rhythm. Heart sounds: Normal heart sounds. No murmur heard. No friction rub. No gallop. Pulmonary:      Effort: Pulmonary effort is normal.      Breath sounds: Normal breath sounds. Abdominal:      Palpations: Abdomen is soft. Comments: Mild bilateral lower abdominal tenderness. Musculoskeletal:         General: No deformity. Cervical back: Neck supple. Skin:     General: Skin is warm and dry. Neurological:      Mental Status: She is alert. Comments: oriented. Normal lower extremity strength and sensation. MDM         Procedures    Progress Note:  Results, treatment, and follow up plan have been discussed with patient. Questions were answered. James Barriga MD  10:49 PM    Assessment/plan: Lower abdominal pain, low back pain, lower extremity pain. Pregnancy test positive. Ultrasound shows 6 weeks IUP. Reassuring appearance/exam with stable vital signs. CBC, CMP, UA (ketones noted) okay. Liter of IV fluids in the ED. OB follow-up. Return precautions.   James Barriga MD  10:50 PM

## 2022-09-13 NOTE — ED NOTES
Pt given written and verbal d/c instructions. She verbalizes understanding of all instructions, incl. Meds and follow up. PIV d/c'd. Catheter tip intact. Pt discharged home, ambulatory, in improved/ stable condition with . Juancho Cee

## 2022-09-13 NOTE — ED TRIAGE NOTES
Pt reports bilat lower abd pain x3 weeks that radiates to lower back. Pt also reports bilat leg pain and swelling x3 weeks. Dizziness, fatigue, weakness x1 week. Denies N/V/D, fever, chills, cough, SOB    Pt is A&O, ambulates without difficulty, bilat LE edema noted.  LMP 09/02/22

## 2022-10-08 ENCOUNTER — HOSPITAL ENCOUNTER (EMERGENCY)
Age: 32
Discharge: HOME | End: 2022-10-08
Attending: FAMILY MEDICINE | Admitting: FAMILY MEDICINE
Payer: MEDICAID

## 2022-10-08 VITALS
SYSTOLIC BLOOD PRESSURE: 124 MMHG | TEMPERATURE: 98.3 F | WEIGHT: 200 LBS | HEART RATE: 95 BPM | HEIGHT: 62 IN | RESPIRATION RATE: 18 BRPM | BODY MASS INDEX: 36.8 KG/M2 | DIASTOLIC BLOOD PRESSURE: 74 MMHG | OXYGEN SATURATION: 100 %

## 2022-10-08 DIAGNOSIS — R59.0 SUBMANDIBULAR LYMPHADENOPATHY: Primary | ICD-10-CM

## 2022-10-08 PROCEDURE — 99282 EMERGENCY DEPT VISIT SF MDM: CPT | Performed by: FAMILY MEDICINE

## 2022-10-08 NOTE — ED PROVIDER NOTES
EMERGENCY DEPARTMENT HISTORY AND PHYSICAL EXAM      Date: 10/8/2022  Patient Name: Nick Mancini    History of Presenting Illness     Chief Complaint   Patient presents with    Gland Swelling       History Provided By:     HPI: Nick Mancini, is a very pleasant 33 y.o. female presenting to the ED with a chief complaint of gland swelling, earache, cough. Patient states her symptoms started 2 days ago. She has dry cough. Also having achy left ear that is improving. She also noticed a mildly tender what she believes to be gland under her left jaw. No fevers chills rigors. Patient states she is currently pregnant. The patient denies any other symptoms at this time. PCP: Alla Tejada MD    No current facility-administered medications on file prior to encounter. Current Outpatient Medications on File Prior to Encounter   Medication Sig Dispense Refill    acetaminophen (TYLENOL) 325 mg tablet Take 650 mg by mouth every six (6) hours as needed for Pain. (Patient not taking: No sig reported)      ALBUTEROL IN Take 1-2 Puffs by inhalation as needed. (Patient not taking: No sig reported)         Past History     Past Medical History:  Past Medical History:   Diagnosis Date    Abnormal Papanicolaou smear of cervix     colpo    Acquired hypothyroidism     HX hypothyroidism since 14, not on medication because she cannot find MD    Anxiety and depression     Asthma     Borderline schizophrenia ([facility] [address]     Gastritis        Past Surgical History:  Past Surgical History:   Procedure Laterality Date    HX LEEP PROCEDURE  2021    HX WISDOM TEETH EXTRACTION      [place]  DELIVERY ONLY       - , , ,        Family History:  History reviewed. No pertinent family history.     Social History:  Social History     Tobacco Use    Smoking status: Former     Packs/day: 2.00     Years: 17.00     Pack years: 34.00     Types: Cigarettes     Quit date: 2020     Years since quittin.7 Smokeless tobacco: Never   Vaping Use    Vaping Use: Every day    Substances: THC, CBD    Devices: Disposable   Substance Use Topics    Alcohol use: Yes     Alcohol/week: 2.0 - 3.0 standard drinks     Types: 2 Shots of liquor per week    Drug use: Never       Allergies: Allergies   Allergen Reactions    Latex Hives    Advil [Ibuprofen] Hives    Apple Hives         Review of Systems     Review of Systems   Constitutional:  Negative for activity change, appetite change, chills, fatigue and fever. HENT:  Positive for ear pain. Negative for congestion and sore throat. Eyes:  Negative for photophobia and visual disturbance. Respiratory:  Positive for cough. Negative for shortness of breath and wheezing. Cardiovascular:  Negative for chest pain, palpitations and leg swelling. Gastrointestinal:  Negative for abdominal pain, diarrhea, nausea and vomiting. Endocrine: Negative for cold intolerance and heat intolerance. Musculoskeletal:  Negative for gait problem and joint swelling. Skin:  Negative for color change and rash. Neurological:  Negative for dizziness and headaches. Physical Exam     Physical Exam  Constitutional:       Appearance: She is well-developed. HENT:      Head: Normocephalic and atraumatic. Mouth/Throat:      Mouth: Mucous membranes are moist.      Pharynx: Oropharynx is clear. Eyes:      Conjunctiva/sclera: Conjunctivae normal.      Pupils: Pupils are equal, round, and reactive to light. Cardiovascular:      Rate and Rhythm: Normal rate and regular rhythm. Heart sounds: No murmur heard. Pulmonary:      Effort: No respiratory distress. Breath sounds: No stridor. No wheezing, rhonchi or rales. Abdominal:      General: There is no distension. Tenderness: There is no abdominal tenderness. There is no rebound. Musculoskeletal:      Cervical back: Normal range of motion and neck supple. Skin:     General: Skin is warm and dry.       Comments: 1 x 1 cm mildly tender well-circumscribed mobile structure left submandibular region   Neurological:      General: No focal deficit present. Mental Status: She is alert and oriented to person, place, and time. Psychiatric:         Mood and Affect: Mood normal.         Behavior: Behavior normal.       Lab and Diagnostic Study Results     Labs -   No results found for this or any previous visit (from the past 12 hour(s)). Radiologic Studies -   @lastxrresult@  CT Results  (Last 48 hours)      None          CXR Results  (Last 48 hours)      None              Medical Decision Making   - I am the first provider for this patient. - I reviewed the vital signs, available nursing notes, past medical history, past surgical history, family history and social history. - Initial assessment performed. The patients presenting problems have been discussed, and they are in agreement with the care plan formulated and outlined with them. I have encouraged them to ask questions as they arise throughout their visit. Vital Signs-Reviewed the patient's vital signs. Patient Vitals for the past 12 hrs:   Temp Pulse Resp BP SpO2   10/08/22 0840 98.3 °F (36.8 °C) 95 18 124/74 100 %         ED Course/ Provider Notes (Medical Decision Making):     Patient presented to the emergency department with the aforementioned chief complaint. On examination the patient is nontoxic. Vitals were reviewed per above. History exam findings consistent with likely viral syndrome with submandibular lymphadenopathy. Discussed supportive measures for symptoms. Patient understands she will need follow-up with her OB/GYN regarding her pregnancy as well as her hypothyroidism. Patient also provided information to follow-up with ENT should her symptoms not improve. Patient understands she can return to emergency department at any time.     Peg Anand was given a thorough list of signs and symptoms that would warrant an immediate return to the emergency department. Otherwise Maple Seen will follow up with PCP. Procedures   Medical Decision Makingedical Decision Making  Performed by: Manju Webster DO  Procedures  None       Disposition   Disposition:     Home     All of the diagnostic tests were reviewed and questions answered. Diagnosis, care plan and treatment options were discussed. The patient understands the instructions and will follow up as directed. The patients results have been reviewed with them. They have been counseled regarding their diagnosis. The patient verbally convey understanding and agreement of the signs, symptoms, diagnosis, treatment and prognosis and additionally agrees to follow up as recommended with their PCP in 24 - 48 hours. They also agree with the care-plan and convey that all of their questions have been answered. I have also put together some discharge instructions for them that include: 1) educational information regarding their diagnosis, 2) how to care for their diagnosis at home, as well a 3) list of reasons why they would want to return to the ED prior to their follow-up appointment, should their condition change. DISCHARGE PLAN:    1. Current Discharge Medication List        CONTINUE these medications which have NOT CHANGED    Details   acetaminophen (TYLENOL) 325 mg tablet Take 650 mg by mouth every six (6) hours as needed for Pain. ALBUTEROL IN Take 1-2 Puffs by inhalation as needed. 2.   Follow-up Information       Follow up With Specialties Details Why Contact Info    Your primary care doctor  Schedule an appointment as soon as possible for a visit in 2 days      [facility]  Call   [address] [place] [address],  [phone]              3.  Return to ED if worse       4. Current Discharge Medication List            Diagnosis     Clinical Impression:    1.  Submandibular lymphadenopathy        Attestations:    Nazia Lizarraga Sid Espinal, DO    Please note that this dictation was completed with [employer], the computer voice recognition software. Quite often unanticipated grammatical, syntax, homophones, and other interpretive errors are inadvertently transcribed by the computer software. Please disregard these errors. Please excuse any errors that have escaped final proofreading. Thank you.

## 2022-10-08 NOTE — Clinical Note
[facility] EMERGENCY DEPARTMENT  [address] [facility] [address]  [phone]    Work/School Note    Date: 10/8/2022    To Whom It May concern:      David Vazquez was seen and treated today in the emergency room by the following provider(s):  Attending Provider: Anselmo Grandchild is excused from work/school on 10/08/22. She is clear to return to work/school on 10/09/22.         Sincerely,          Mauricio Bowne, DO

## 2022-10-11 LAB — N. GONORRHEA, EXTERNAL: NEGATIVE

## 2022-10-12 LAB
ANTIBODY SCREEN, EXTERNAL: NEGATIVE
CHLAMYDIA, EXTERNAL: NEGATIVE
HBSAG, EXTERNAL: NEGATIVE
HIV, EXTERNAL: NEGATIVE
RPR, EXTERNAL: NORMAL
RUBELLA, EXTERNAL: NORMAL

## 2022-10-26 ENCOUNTER — DOCUMENTATION ONLY (OUTPATIENT)
Dept: PERINATAL CARE | Age: 32
End: 2022-10-26

## 2022-10-26 ENCOUNTER — HOSPITAL ENCOUNTER (OUTPATIENT)
Dept: PERINATAL CARE | Age: 32
Discharge: HOME | End: 2022-10-26
Attending: OBSTETRICS & GYNECOLOGY

## 2022-10-26 ENCOUNTER — HOSPITAL ENCOUNTER (OUTPATIENT)
Dept: PERINATAL CARE | Age: 32
Discharge: HOME | End: 2022-10-26
Attending: OBSTETRICS & GYNECOLOGY
Payer: MEDICAID

## 2022-10-26 PROCEDURE — 76813 OB US NUCHAL MEAS 1 GEST: CPT | Performed by: OBSTETRICS & GYNECOLOGY

## 2022-10-26 NOTE — PROGRESS NOTES
Reba Reardon was seen on October 26th, 2022 in our [address] [place] [address] office for genetic counseling regarding her increased risk to be a carrier of spinal muscular atrophy (SMA). Gladys Saeed is 33years old and will be 32 at the TIMMY; [id number]. The TIMMY for this pregnancy is 4/30/2023. Genetic counseling was performed via Telemedicine today. The patient was unaccompanied to her appointment. Recommendations:    - The patient is interested in pursuing SMA carrier screening for the FOB but elected to check with the lab first regarding pricing before proceeding. She stated her intention to contact our office once the couple makes a final decision.  - The patient reports that aneuploidy screening, specifically non-invasive prenatal testing (NIPT) was drawn already through her OB's office and is currently pending.  - The patient DECLINED both diagnostic testing options at this time. - An ultrasound and MFM consult will be performed today by Dr. Alva Lee MD. Please see her note for further details. The following information was discussed with the patient:    Genetic Screening:    Gladys Saeed had carrier screening for both Cystic Fibrosis (CF) and Spinal Muscular Atrophy (SMA). The patient was identified as possibly being a carrier of SMA, while she screened negative as a carrier of CF. It was reviewed with the patient that her normal CF results significantly reduce, but do not eliminate, the chance that she is a carrier for that condition and therefore the risk to have an affected child. Deepthi's results indicated that she is potentially a carrier for SMA. Spinal muscular atrophy is an autosomal recessive conditions caused by mutations in the SMN1 gene. According to the lab, although 2 copies of the SMN1 gene were detected by multiplex ligation-dependent probe amplification in this patient, a specific linked variant was present.  When this linked variant is present, there is a slightly increased risk for the individual to be a silent carrier, meaning that the two copies of the SMN1 gene are in cis (on the same chromosome) instead of in trans (on opposite chromosomes). Spinal muscular atrophy is a genetic disorder that affects the control of muscle movement. It is caused by a loss of specialized nerve cells, called motor neurons, in the spinal cord and the part of the brain that is connected to the brainstem. The loss of motor neurons leads to weakness and atrophy of muscles used for activities such as crawling, walking, sitting up, and controlling head movement. In severe cases of spinal muscular atrophy, the muscles used for breathing and swallowing are affected. There are many types of spinal muscular atrophy distinguished by the pattern of features, severity of muscle weakness, and age of onset. Spinal muscular atrophy affects 1 per 8,000 to 10,000 people worldwide. Disease-causing mutations of the SMN1 gene span a continuum of phenotypes without clear delineation of clinical subtypes, although classification by age of onset and maximum function achieved is useful for prognosis and management of this condition. Spinal muscular atrophy type I is the most common type, accounting for about half of all cases. Types II and III are the next most common and types 0 and IV are rare. Mutations in the SMN1 gene cause all types of spinal muscular atrophy listed above. Most people with spinal muscular atrophy are missing a piece of the SMN1 gene, which impairs SMN protein production. The number of copies of the SMN2 gene modifies the severity of the condition and helps determine which type develops. Diagnosis is dependent upon molecular genetic testing of the two genes associated with this condition, SMN 1 and SMN 2.   Carrier testing determines the number of SMN1 copies present in an individual.  However, interpretation of this test can be complicated by the fact that some individuals carry two copies of the gene on the same chromosome (instead of on opposite chromosomes) and may, therefore, have a residual chance of being a carrier for the condition. Also, 2% of individuals with SMA have a de zaida mutation, meaning that only one parent is a carrier. The carrier rate for spinal muscular atrophy in the general population is approximately 1 in 48. If the two SMN1 copies are on opposite chromosomes, then this individual is predicted not to be a carrier of SMA. If the two SMN1 copies are on the same chromosome, then this individual is predicted to be a carrier of SMA. This assay is unable to distinguish between those two possibilities. The patient reports both  and  ancestry. The residual risks for both of these are 1 in 29 and 1 in 140 respectively; the higher of the two risk numbers was used. Based on the background population risk of 1 in 50 to be a carrier of SMA, the couple's current risk to have a child with SMA is 1 in 5,800 (1/29 x 1/50 x ¼). The benefits, risks and limitations of SMA carrier screening for the FOB were reviewed in depth. At this time the patient is interested in pursuing this testing for the FOB but wanted to discuss the information with him and also speak further with the lab regarding costs before making a final decision. She stated her intention to contact our office once they have made a final decision and we will establish a plan of care from there. The availability of more expanded carrier screening options were presented to the patient today. She DECLINED further discussion of expanded carrier screening at this time. The patient believes that both Down syndrome and gender blood screening were also performed through her OB, although those orders and/or results are not available for my review today. She thinks the results are still pending, and she stated that she is planning to call their office to check on the status of those results.  In the event that this testing has not been ordered, we are happy to coordinate this testing at a later time. The benefits, risks and limitations of NIPT, ultrasonography, CVS and amniocentesis in the diagnosis of fetal aneuploidy were reviewed in depth. Non-invasive prenatal testing (NIPT) was discussed as a genetic screening option. NIPT uses maternal serum to acquire circulating cell-free fetal DNA. The testing can be performed using one of two broad types of technology. In the first, the cell-free fetal DNA is obtained via a maternal blood draw and then converted into a genomic DNA library for the determination of chromosome 21, 18, 13, and X and Y representation based on massively parallel genomic sequencing. This testing detects Down syndrome with greater than 99% accuracy, Trisomy 18 with >97% sensitivity, and Trisomy 13 with greater than 87% sensitivity. Masterson syndrome is detected in 95% of cases, and gender is accurately predicted in >99% of cases. With the second method, single-nucleotide polymorphisms (SNPs) are used to perform targeted sequencing of areas of interest (representing chromosomes 21, 18, 13, and the sex chromosomes). Triploidy can also be reliably detected. Testing for twin pregnancies can now be performed via the SNP method. The sensitivity is thought to be greater than 99% across all 4 pairs of chromosomes targeted in breen pregnancies. Which of these technologies is appropriate for a given patient depends upon several factors and is determined by the genetic counselor. Testing is offered most frequently for women considered at high risk for chromosomal aneuploidy. This includes but is not limited to indications such as advanced maternal age, abnormal maternal serum screening result, ultrasound abnormalities, and previous history of a child with a chromosomal aneuploidy.   ACOG recently published new recommendations that this testing also be offered as a screening option in low risk pregnancies, although insurance typically does not cover it. While this type of testing is often called \"non-invasive prenatal diagnosis,\" it is important to distinguish that this testing is not considered diagnostic. There is the possibility of an inconclusive result. Inconclusive results would require follow-up CVS or amniocentesis to determine fetal karyotype. All abnormal results should be followed up with a confirmatory CVS or amniocentesis. Unlike CVS and amniocentesis, this test cannot detect other chromosome abnormalities such as chromosomal rearrangements or mosaicism. Currently, insurance companies may or may not cover the cost of this testing. We always provide patients with the most up to date price quotes and coverage information that we have in good liza. Because insurance companies are so rapidly changing what will be covered or declined, we cannot guarantee a precise patient bill. For patients that elect NIPT, a single marker AFP can also be ordered after 15 weeks gestation to determine open neural tube defect (ONTD) risk information. The benefits, risks and limitations of diagnostic chorionic villus sampling (CVS) for fetal diagnosis were reviewed in depth. The technique, timing, risks, and benefits were emphasized. is typically performed between 11 and 14 weeks gestation during pregnancy. The test removes a small sample of the placenta, either using a needle through the stomach (transabdominal CVS) or a catheter through the cervix (transcervical CVS). The risk of complications that could lead to a miscarriage is approximately 1 in 300. The benefits, risks and limitations of diagnostic amniocentesis for fetal diagnosis were reviewed in depth. The technique, timing, risks, and benefits were emphasized. Amniocentesis is typically performed between 12 and 20 weeks gestation, although it can often be performed earlier or later with reasonable safety, depending on the circumstances of the case.  The risk for complication from amniocentesis is 1/800. The accuracy of amniocentesis is greater than 99% for chromosomal abnormalities such as aneuploidy, and approximately 98% for open neural tube defects when used in combination with detailed anatomic ultrasound. The accuracy of other genetic testing ordered varies depending on the condition being tested for and the laboratory performing the testing. In addition, there are conditions that cannot be tested for prenatally, and would therefore not be detectable with amniocentesis. At this time the patient DECLINED both diagnostic testing options. The patient DECLINED a full family and pregnancy history review today, stating that she has no concerns regarding her family medical and genetics history. After our discussion, [address] [place] [address],[address] verbalized understanding of the information presented to her and had no further questions or concerns at this time. Tammi Rodriguez MS, [facility]  Licensed, Certified Genetic Counselor    30 minutes were spent via telemedicine with the patient for genetic evaluation including risk assessment, counseling regarding relevant genetic disorders and explanation of appropriate genetic testing options. Jazlyn Escobar, was evaluated through a synchronous (real-time) audio-video encounter. The patient (or guardian if applicable) is aware that this is a billable service, which includes applicable co-pays. This Virtual Visit was conducted with patient's (and/or legal guardian's) consent. The visit was conducted pursuant to the emergency declaration under the [facility], [address] [place] [address] waiver authority and the [employer] and [employer] General Act. Patient identification was verified, and a caregiver was present when appropriate. The patient was located at: Facility (Appt Department): [address] [place] [address]  [phone]  The provider was located at:  Facility (Appt Department): [facility]

## 2022-12-14 ENCOUNTER — HOSPITAL ENCOUNTER (OUTPATIENT)
Dept: PERINATAL CARE | Age: 32
Discharge: HOME | End: 2022-12-14
Attending: OBSTETRICS & GYNECOLOGY
Payer: MEDICAID

## 2022-12-14 PROCEDURE — 76811 OB US DETAILED SNGL FETUS: CPT | Performed by: OBSTETRICS & GYNECOLOGY

## 2022-12-14 PROCEDURE — 76817 TRANSVAGINAL US OBSTETRIC: CPT | Performed by: OBSTETRICS & GYNECOLOGY

## 2023-02-15 ENCOUNTER — HOSPITAL ENCOUNTER (EMERGENCY)
Age: 33
Discharge: HOME | End: 2023-02-15
Attending: STUDENT IN AN ORGANIZED HEALTH CARE EDUCATION/TRAINING PROGRAM | Admitting: OBSTETRICS & GYNECOLOGY
Payer: MEDICAID

## 2023-02-15 VITALS
TEMPERATURE: 98.3 F | WEIGHT: 240 LBS | HEART RATE: 85 BPM | SYSTOLIC BLOOD PRESSURE: 99 MMHG | RESPIRATION RATE: 17 BRPM | DIASTOLIC BLOOD PRESSURE: 56 MMHG | BODY MASS INDEX: 44.16 KG/M2 | HEIGHT: 62 IN | OXYGEN SATURATION: 99 %

## 2023-02-15 LAB
ALBUMIN SERPL-MCNC: 2.9 G/DL (ref 3.5–5)
ALBUMIN/GLOB SERPL: 0.8 (ref 1.1–2.2)
ALP SERPL-CCNC: 79 U/L (ref 45–117)
ALT SERPL-CCNC: 20 U/L (ref 12–78)
ANION GAP SERPL CALC-SCNC: 5 MMOL/L (ref 5–15)
AST SERPL-CCNC: 21 U/L (ref 15–37)
BASOPHILS # BLD: 0 K/UL (ref 0–0.1)
BASOPHILS NFR BLD: 0 % (ref 0–1)
BILIRUB SERPL-MCNC: 0.3 MG/DL (ref 0.2–1)
BUN SERPL-MCNC: 7 MG/DL (ref 6–20)
BUN/CREAT SERPL: 12 (ref 12–20)
CALCIUM SERPL-MCNC: 8.7 MG/DL (ref 8.5–10.1)
CHLORIDE SERPL-SCNC: 109 MMOL/L (ref 97–108)
CO2 SERPL-SCNC: 23 MMOL/L (ref 21–32)
CREAT SERPL-MCNC: 0.57 MG/DL (ref 0.55–1.02)
DIFFERENTIAL METHOD BLD: ABNORMAL
EOSINOPHIL # BLD: 0.2 K/UL (ref 0–0.4)
EOSINOPHIL NFR BLD: 2 % (ref 0–7)
ERYTHROCYTE [DISTWIDTH] IN BLOOD BY AUTOMATED COUNT: 15.9 % (ref 11.5–14.5)
GLOBULIN SER CALC-MCNC: 3.8 G/DL (ref 2–4)
GLUCOSE SERPL-MCNC: 90 MG/DL (ref 65–100)
HCT VFR BLD AUTO: 33.7 % (ref 35–47)
HGB BLD-MCNC: 10.6 G/DL (ref 11.5–16)
IMM GRANULOCYTES # BLD AUTO: 0 K/UL (ref 0–0.04)
IMM GRANULOCYTES NFR BLD AUTO: 0 % (ref 0–0.5)
LYMPHOCYTES # BLD: 1.8 K/UL (ref 0.8–3.5)
LYMPHOCYTES NFR BLD: 17 % (ref 12–49)
MCH RBC QN AUTO: 24.3 PG (ref 26–34)
MCHC RBC AUTO-ENTMCNC: 31.5 G/DL (ref 30–36.5)
MCV RBC AUTO: 77.3 FL (ref 80–99)
MONOCYTES # BLD: 0.6 K/UL (ref 0–1)
MONOCYTES NFR BLD: 5 % (ref 5–13)
NEUTS SEG # BLD: 7.9 K/UL (ref 1.8–8)
NEUTS SEG NFR BLD: 76 % (ref 32–75)
NRBC # BLD: 0 K/UL (ref 0–0.01)
NRBC BLD-RTO: 0 PER 100 WBC
PLATELET # BLD AUTO: 245 K/UL (ref 150–400)
PMV BLD AUTO: 10.4 FL (ref 8.9–12.9)
POTASSIUM SERPL-SCNC: 3.9 MMOL/L (ref 3.5–5.1)
PROT SERPL-MCNC: 6.7 G/DL (ref 6.4–8.2)
RBC # BLD AUTO: 4.36 M/UL (ref 3.8–5.2)
SODIUM SERPL-SCNC: 137 MMOL/L (ref 136–145)
WBC # BLD AUTO: 10.6 K/UL (ref 3.6–11)

## 2023-02-15 PROCEDURE — 99285 EMERGENCY DEPT VISIT HI MDM: CPT

## 2023-02-15 PROCEDURE — 85025 COMPLETE CBC W/AUTO DIFF WBC: CPT

## 2023-02-15 PROCEDURE — 74011250636 HC RX REV CODE- 250/636: Performed by: OBSTETRICS & GYNECOLOGY

## 2023-02-15 PROCEDURE — 36415 COLL VENOUS BLD VENIPUNCTURE: CPT

## 2023-02-15 PROCEDURE — 75810000275 HC EMERGENCY DEPT VISIT NO LEVEL OF CARE

## 2023-02-15 PROCEDURE — 80053 COMPREHEN METABOLIC PANEL: CPT

## 2023-02-15 RX ADMIN — SODIUM CHLORIDE, POTASSIUM CHLORIDE, SODIUM LACTATE AND CALCIUM CHLORIDE 999 ML/HR: 600; 310; 30; 20 INJECTION, SOLUTION INTRAVENOUS at 19:57

## 2023-02-16 NOTE — H&P
History & Physical    Name: Muna Gilman MRN: [id number]  SSN: [id number]    YOB: 1990  Age: 33 y.o. Sex: female        Subjective:     Estimated Date of Delivery: 23  OB History          9    Para   4    Term   4            AB   4    Living   4         SAB   4    IAB        Ectopic        Molar        Multiple   0    Live Births   4                MsTab Arnold is admitted with pregnancy at 29w3d for  intermittent lower abdominal and back pain . Pain started about 3 hours ago when she woke up from nap. C/o nausea and vomiting all day - has not kept anything down. Daughter was also sick. Prenatal course short cervix without hx of  delivery. Please see prenatal records for details. Past Medical History:   Diagnosis Date    Abnormal Papanicolaou smear of cervix     colpo    Acquired hypothyroidism     HX hypothyroidism since , not on medication because she cannot find MD    Anxiety and depression     Asthma     Borderline schizophrenia ([facility] [address]     Gastritis      Past Surgical History:   Procedure Laterality Date    HX LEEP PROCEDURE  2021    HX WISDOM TEETH EXTRACTION      [place]  DELIVERY ONLY       - , , ,      Social History     Occupational History    Not on file   Tobacco Use    Smoking status: Former     Packs/day: 2.00     Years: 17.00     Pack years: 34.00     Types: Cigarettes     Quit date:      Years since quitting: 3.1    Smokeless tobacco: Never   Vaping Use    Vaping Use: Every day    Substances: THC, CBD    Devices: Disposable   Substance and Sexual Activity    Alcohol use: Yes     Alcohol/week: 2.0 - 3.0 standard drinks     Types: 2 Shots of liquor per week    Drug use: Never    Sexual activity: Yes     Partners: Male     Birth control/protection: None     No family history on file.     Allergies   Allergen Reactions    Latex Hives    Advil [Ibuprofen] Hives    Apple Hives     Prior to Admission medications    Medication Sig Start Date End Date Taking? Authorizing Provider   PNV Comb #2-Iron-Omega 3-FA [id number] mg cmpk Take  by mouth. Yes Provider, Historical   acetaminophen (TYLENOL) 325 mg tablet Take 650 mg by mouth every six (6) hours as needed for Pain. Patient not taking: No sig reported    Provider, Historical   ALBUTEROL IN Take 1-2 Puffs by inhalation as needed. Patient not taking: No sig reported    Provider, Historical        Review of Systems: A comprehensive review of systems was negative except for that written in the HPI. Objective:     Vitals:  Vitals:    02/15/23 1930   Weight: 240 lb (108.9 kg)   Height: 5' 2\" (1.575 m)        Physical Exam:  Patient without distress. Heart: Regular rate and rhythm  Lung: clear to auscultation throughout lung fields, no wheezes, no rales, no rhonchi, and normal respiratory effort  Abdomen: soft, nontender  Fundus: soft and non tender  Perineum: blood absent, amniotic fluid absent  Membranes:  Intact  Fetal Heart Rate: Reactive  Results for orders placed or performed during the hospital encounter of 02/15/23   CBC WITH AUTOMATED DIFF   Result Value Ref Range    WBC 10.6 3.6 - 11.0 K/uL    RBC 4.36 3.80 - 5.20 M/uL    HGB 10.6 (L) 11.5 - 16.0 g/dL    HCT 33.7 (L) 35.0 - 47.0 %    MCV 77.3 (L) 80.0 - 99.0 FL    MCH 24.3 (L) 26.0 - 34.0 PG    MCHC 31.5 30.0 - 36.5 g/dL    RDW 15.9 (H) 11.5 - 14.5 %    PLATELET 009 913 - 219 K/uL    MPV 10.4 8.9 - 12.9 FL    NRBC 0.0 0  WBC    ABSOLUTE NRBC 0.00 0.00 - 0.01 K/uL    NEUTROPHILS 76 (H) 32 - 75 %    LYMPHOCYTES 17 12 - 49 %    MONOCYTES 5 5 - 13 %    EOSINOPHILS 2 0 - 7 %    BASOPHILS 0 0 - 1 %    IMMATURE GRANULOCYTES 0 0.0 - 0.5 %    ABS. NEUTROPHILS 7.9 1.8 - 8.0 K/UL    ABS. LYMPHOCYTES 1.8 0.8 - 3.5 K/UL    ABS. MONOCYTES 0.6 0.0 - 1.0 K/UL    ABS. EOSINOPHILS 0.2 0.0 - 0.4 K/UL    ABS. BASOPHILS 0.0 0.0 - 0.1 K/UL    ABS. IMM.  GRANS. 0.0 0.00 - 0.04 K/UL    DF AUTOMATED     METABOLIC PANEL, COMPREHENSIVE   Result Value Ref Range    Sodium 137 136 - 145 mmol/L    Potassium 3.9 3.5 - 5.1 mmol/L    Chloride 109 (H) 97 - 108 mmol/L    CO2 23 21 - 32 mmol/L    Anion gap 5 5 - 15 mmol/L    Glucose 90 65 - 100 mg/dL    BUN 7 6 - 20 MG/DL    Creatinine 0.57 0.55 - 1.02 MG/DL    BUN/Creatinine ratio 12 12 - 20      eGFR >60 >60 ml/min/1.73m2    Calcium 8.7 8.5 - 10.1 MG/DL    Bilirubin, total 0.3 0.2 - 1.0 MG/DL    ALT (SGPT) 20 12 - 78 U/L    AST (SGOT) 21 15 - 37 U/L    Alk. phosphatase 79 45 - 117 U/L    Protein, total 6.7 6.4 - 8.2 g/dL    Albumin 2.9 (L) 3.5 - 5.0 g/dL    Globulin 3.8 2.0 - 4.0 g/dL    A-G Ratio 0.8 (L) 1.1 - 2.2         Assessment/Plan:     Plan: Admit for observation. Check labs, rehydrate. No contractions seen on monitor currently.          Jose Alfredo Cochran MD  [facility] Hospitalist  OB    Signed By:  Jose Alfredo Cochran MD     February 15, 2023

## 2023-02-16 NOTE — PROGRESS NOTES
1915-Patient arrived to labor and Delivery with N/V and abdominal, back and pelvic pain starting today. Patient stating he has not been able to keep anything down and daughter has been throwing up as well. 1945-Mily Cazares at bedside for patient evaluation. 2148-Mily Cazares at bedside, SVE performed. Patient closed, stating she is no longer having nausea and would like to go home. 2212-Discharge instructions given, patient voiced understanding. 2214-patient discharged ambulatory with s/o.

## 2023-02-16 NOTE — PROCEDURES
NST Procedure Note    Patient: Rona Wallace               Sex: female          DOA: 2/15/2023       YOB: 1990      Age:  33 y.o.        LOS:  LOS: 0 days     MRN: [id number]                    CSN: [id number]      Estimated Gestational Age:29w3d     Indication for NST: Pre-Term labor evaluation    NST: 15x15    Fetal Vital Signs:  Mode: External  Fetal Heart Rate:150  Fetal Activity: Present  Variability: Moderate 6-25 bpm  Decelerations:No  Accelerations:Yes  FHR Interpretations:Category I    Non-Stress Test: obgyn fetal nst findings: reactive    Uterine Activity:  Mode: External  Frequency (min): none     Signed by:Hortensia Dejesus MD  2/15/2023 9:44 PM

## 2023-02-16 NOTE — PROGRESS NOTES
Ante Partum Progress Note    Obinna Gilliam  [id number]        Patient states she does not have nausea    Vitals:  Visit Vitals  Ht 5' 2\" (1.575 m)   Wt 240 lb (108.9 kg)   LMP  (LMP Unknown)   BMI 43.90 kg/m²     No data recorded. Last 24hr Input/Output:  No intake or output data in the 24 hours ending 02/15/23 2151     Non stress test:  Reactive    Uterine Activity: None     Exam:  Patient without distress.      Abdomen, fundus soft non-tender     Extremities, no redness or tenderness       Cervix closed and firm          Additional Exam: Abdomen soft, non-tender and Fundus soft and non tender    Labs:     Lab Results   Component Value Date/Time    WBC 10.6 02/15/2023 07:59 PM    WBC 10.4 09/12/2022 08:46 PM    WBC 7.5 11/05/2021 11:03 AM    WBC 9.8 01/19/2016 03:04 AM    WBC 10.0 01/18/2016 10:41 AM    WBC 11.6 (H) 01/17/2016 03:30 AM    WBC 10.9 05/19/2015 11:10 PM    WBC 11.6 (H) 03/01/2015 04:15 PM    WBC 8.3 10/22/2014 09:20 PM    WBC 10.8 07/02/2014 02:50 AM    WBC 10.5 06/30/2014 10:28 PM    HGB 10.6 (L) 02/15/2023 07:59 PM    HGB 11.0 (L) 09/12/2022 08:46 PM    HGB 11.6 11/05/2021 11:03 AM    HGB 9.0 (L) 01/19/2016 03:04 AM    HGB 9.6 (L) 01/18/2016 10:41 AM    HGB 9.8 (L) 01/17/2016 03:30 AM    HGB 11.3 (L) 05/19/2015 11:10 PM    HGB 11.9 03/01/2015 04:15 PM    HGB 12.6 10/22/2014 09:20 PM    HGB 9.6 (L) 07/02/2014 02:50 AM    HGB 11.9 06/30/2014 10:28 PM    HCT 33.7 (L) 02/15/2023 07:59 PM    HCT 34.8 (L) 09/12/2022 08:46 PM    HCT 38.4 11/05/2021 11:03 AM    HCT 29.0 (L) 01/19/2016 03:04 AM    HCT 30.4 (L) 01/18/2016 10:41 AM    HCT 31.2 (L) 01/17/2016 03:30 AM    HCT 34.6 (L) 05/19/2015 11:10 PM    HCT 37.6 03/01/2015 04:15 PM    HCT 39.0 10/22/2014 09:20 PM    HCT 29.6 (L) 07/02/2014 02:50 AM    HCT 35.9 06/30/2014 10:28 PM    PLATELET 971 17/17/6076 07:59 PM    PLATELET 298 11/45/6134 08:46 PM    PLATELET 480 19/85/1412 11:03 AM    PLATELET 840 52/70/9367 03:04 AM    PLATELET 989 40/31/6706 10:41 AM PLATELET 505 71/14/2288 03:30 AM    PLATELET 186 97/85/6919 11:10 PM    PLATELET 451 00/57/7687 04:15 PM    PLATELET 814 94/70/4690 09:20 PM    PLATELET 864 58/06/8144 02:50 AM    PLATELET 132 88/50/0027 10:28 PM    Hgb, External 11.7 07/14/2015 12:00 AM    Hgb, External 11.9 06/30/2014 12:00 AM    Hct, External 35.6 07/14/2015 12:00 AM    Hct, External 35.9 06/30/2014 12:00 AM    Platelet cnt., External 239 07/14/2015 12:00 AM    Platelet cnt., External 199 06/30/2014 12:00 AM       Recent Results (from the past 24 hour(s))   CBC WITH AUTOMATED DIFF    Collection Time: 02/15/23  7:59 PM   Result Value Ref Range    WBC 10.6 3.6 - 11.0 K/uL    RBC 4.36 3.80 - 5.20 M/uL    HGB 10.6 (L) 11.5 - 16.0 g/dL    HCT 33.7 (L) 35.0 - 47.0 %    MCV 77.3 (L) 80.0 - 99.0 FL    MCH 24.3 (L) 26.0 - 34.0 PG    MCHC 31.5 30.0 - 36.5 g/dL    RDW 15.9 (H) 11.5 - 14.5 %    PLATELET 488 159 - 496 K/uL    MPV 10.4 8.9 - 12.9 FL    NRBC 0.0 0  WBC    ABSOLUTE NRBC 0.00 0.00 - 0.01 K/uL    NEUTROPHILS 76 (H) 32 - 75 %    LYMPHOCYTES 17 12 - 49 %    MONOCYTES 5 5 - 13 %    EOSINOPHILS 2 0 - 7 %    BASOPHILS 0 0 - 1 %    IMMATURE GRANULOCYTES 0 0.0 - 0.5 %    ABS. NEUTROPHILS 7.9 1.8 - 8.0 K/UL    ABS. LYMPHOCYTES 1.8 0.8 - 3.5 K/UL    ABS. MONOCYTES 0.6 0.0 - 1.0 K/UL    ABS. EOSINOPHILS 0.2 0.0 - 0.4 K/UL    ABS. BASOPHILS 0.0 0.0 - 0.1 K/UL    ABS. IMM. GRANS. 0.0 0.00 - 0.04 K/UL    DF AUTOMATED     METABOLIC PANEL, COMPREHENSIVE    Collection Time: 02/15/23  7:59 PM   Result Value Ref Range    Sodium 137 136 - 145 mmol/L    Potassium 3.9 3.5 - 5.1 mmol/L    Chloride 109 (H) 97 - 108 mmol/L    CO2 23 21 - 32 mmol/L    Anion gap 5 5 - 15 mmol/L    Glucose 90 65 - 100 mg/dL    BUN 7 6 - 20 MG/DL    Creatinine 0.57 0.55 - 1.02 MG/DL    BUN/Creatinine ratio 12 12 - 20      eGFR >60 >60 ml/min/1.73m2    Calcium 8.7 8.5 - 10.1 MG/DL    Bilirubin, total 0.3 0.2 - 1.0 MG/DL    ALT (SGPT) 20 12 - 78 U/L    AST (SGOT) 21 15 - 37 U/L    Alk. phosphatase 79 45 - 117 U/L    Protein, total 6.7 6.4 - 8.2 g/dL    Albumin 2.9 (L) 3.5 - 5.0 g/dL    Globulin 3.8 2.0 - 4.0 g/dL    A-G Ratio 0.8 (L) 1.1 - 2.2         Assessment: 29w3d   Nausea better, no vomiting  Hx of CS x 4    Plan:  pain is more in the lower abdomen groin area - no sig contractions on monitor, closed. Patient looks comfortable. Offered pain meds she declines. Desires to go home. Advised to fu with reg OB tomorrow if symptoms return.     Tyler Mayfield MD  [facility] Hospitalist  [facility]

## 2023-03-09 ENCOUNTER — HOSPITAL ENCOUNTER (EMERGENCY)
Age: 33
Discharge: HOME | End: 2023-03-09
Attending: OBSTETRICS & GYNECOLOGY | Admitting: OBSTETRICS & GYNECOLOGY
Payer: MEDICAID

## 2023-03-09 VITALS
HEART RATE: 97 BPM | RESPIRATION RATE: 16 BRPM | DIASTOLIC BLOOD PRESSURE: 65 MMHG | SYSTOLIC BLOOD PRESSURE: 124 MMHG | TEMPERATURE: 97.7 F | OXYGEN SATURATION: 100 %

## 2023-03-09 LAB
APPEARANCE UR: CLEAR
BACTERIA URNS QL MICRO: NEGATIVE /HPF
BILIRUB UR QL: NEGATIVE
COLOR UR: ABNORMAL
EPITH CASTS URNS QL MICRO: ABNORMAL /LPF
FIBRONECTIN FETAL VAG QL: NEGATIVE
GLUCOSE UR STRIP.AUTO-MCNC: NEGATIVE MG/DL
HGB UR QL STRIP: NEGATIVE
HYALINE CASTS URNS QL MICRO: ABNORMAL /LPF (ref 0–2)
KETONES UR QL STRIP.AUTO: NEGATIVE MG/DL
LEUKOCYTE ESTERASE UR QL STRIP.AUTO: NEGATIVE
NITRITE UR QL STRIP.AUTO: NEGATIVE
PH UR STRIP: 7 (ref 5–8)
PROT UR STRIP-MCNC: NEGATIVE MG/DL
RBC #/AREA URNS HPF: ABNORMAL /HPF (ref 0–5)
SP GR UR REFRACTOMETRY: 1 (ref 1–1.03)
UA: UC IF INDICATED,UAUC: ABNORMAL
UROBILINOGEN UR QL STRIP.AUTO: 0.2 EU/DL (ref 0.2–1)
WBC URNS QL MICRO: ABNORMAL /HPF (ref 0–4)

## 2023-03-09 PROCEDURE — 74011250636 HC RX REV CODE- 250/636: Performed by: STUDENT IN AN ORGANIZED HEALTH CARE EDUCATION/TRAINING PROGRAM

## 2023-03-09 PROCEDURE — 82731 ASSAY OF FETAL FIBRONECTIN: CPT

## 2023-03-09 PROCEDURE — 74011250637 HC RX REV CODE- 250/637: Performed by: STUDENT IN AN ORGANIZED HEALTH CARE EDUCATION/TRAINING PROGRAM

## 2023-03-09 PROCEDURE — 81001 URINALYSIS AUTO W/SCOPE: CPT

## 2023-03-09 RX ORDER — ACETAMINOPHEN 500 MG
1000 TABLET ORAL
Status: DISCONTINUED | OUTPATIENT
Start: 2023-03-09 | End: 2023-03-09 | Stop reason: HOSPADM

## 2023-03-09 RX ADMIN — ACETAMINOPHEN 1000 MG: 500 TABLET ORAL at 17:14

## 2023-03-09 RX ADMIN — SODIUM CHLORIDE, POTASSIUM CHLORIDE, SODIUM LACTATE AND CALCIUM CHLORIDE 1000 ML: 600; 310; 30; 20 INJECTION, SOLUTION INTRAVENOUS at 16:31

## 2023-03-09 NOTE — H&P
History & Physical    Name: Muna Gilman MRN: [id number]  SSN: [id number]    YOB: 1990  Age: 33 y.o. Sex: female        Subjective:     Estimated Date of Delivery: 23  OB History          9    Para   4    Term   4            AB   4    Living   4         SAB   4    IAB        Ectopic        Molar        Multiple   0    Live Births   4                MsTab Arnold is admitted with pregnancy at 32w4d for r/o PTL. C/o pelvic pain in the office. She states dull constant pain in hips moreso than her uterus. Not coming and going. 5-6/10. Hips and low back. No dysuria/hematuria/frequency/urgency/n/v/d/c. Baby moving well. No LOF. Past Medical History:   Diagnosis Date    Abnormal Papanicolaou smear of cervix     colpo    Acquired hypothyroidism     HX hypothyroidism since 14, not on medication because she cannot find MD    Anxiety and depression     Asthma     Borderline schizophrenia ([facility] [address]     Gastritis      Past Surgical History:   Procedure Laterality Date    HX LEEP PROCEDURE  2021    HX WISDOM TEETH EXTRACTION      ME  DELIVERY ONLY       - , , ,      Social History     Occupational History    Not on file   Tobacco Use    Smoking status: Former     Packs/day: 2.00     Years: 17.00     Pack years: 34.00     Types: Cigarettes     Quit date:      Years since quitting: 3.1    Smokeless tobacco: Never   Vaping Use    Vaping Use: Every day    Substances: THC, CBD    Devices: Disposable   Substance and Sexual Activity    Alcohol use: Yes     Alcohol/week: 2.0 - 3.0 standard drinks     Types: 2 Shots of liquor per week    Drug use: Never    Sexual activity: Yes     Partners: Male     Birth control/protection: None     No family history on file. Allergies   Allergen Reactions    Latex Hives    Advil [Ibuprofen] Hives    Apple Hives     Prior to Admission medications    Medication Sig Start Date End Date Taking?  Authorizing Provider   PNV Comb #2-Iron-Omega 3-FA [id number] mg cmpk Take  by mouth. Provider, Historical   acetaminophen (TYLENOL) 325 mg tablet Take 650 mg by mouth every six (6) hours as needed for Pain. Patient not taking: No sig reported    Provider, Historical   ALBUTEROL IN Take 1-2 Puffs by inhalation as needed. Patient not taking: No sig reported    Provider, Historical        Review of Systems: A comprehensive review of systems was negative except for that written in the HPI. Objective:     Vitals:  Vitals:    03/09/23 1531 03/09/23 1536 03/09/23 1541 03/09/23 1546   BP:       Pulse:       Resp:       Temp:       SpO2: 100% 100% 100% 100%        Physical Exam:  Patient without distress.   Back: costovertebral angle tenderness absent  Abdomen: soft, nontender, fundus appropriate  Cervical Exam: Closed/Thick/High  Fetal Heart Rate: reactive, acontractile     Prenatal Labs:   Lab Results   Component Value Date/Time    Rubella, External Immune 07/14/2015 12:00 AM    HBsAg, External negative 07/14/2015 12:00 AM    HIV, External negative 07/14/2015 12:00 AM    RPR, External Nonreactive 07/01/2014 09:30 AM    Gonorrhea, External negative 06/25/2015 12:00 AM    Chlamydia, External negative 06/25/2015 12:00 AM        Assessment/Plan:     Plan: [id number] at 32w4d, multiple prior cesareans w/ likely round ligament pain/pelvic pain of pregnancy  - UA pending  - Tylenol to be admin now  - Heat  - IV fluids to be admin now  - Exam closed, unchanged from the office  - Reassess in a few hours, if feeling better can be discharged  - Reactive NST, acontractile    Signed By:  Yohannes Villegas MD     March 9, 2023

## 2023-03-09 NOTE — PROGRESS NOTES
1505 PT ambulates onto unit with reports of lower abdominal pain/cramping that radiates to the lower back. PT has no LOF, VB/VD, no HA/BV/RUQ pain. 1600 MD called and requested that RN start IV fluids. 1655 This RN discussing plan of care with MD. MD says to finish bag of fluid, and give PT tylenol for pain and we will re-evaluate. PT can most likely discharge later tonight if she starts feeling better. 1645 NST is reactive. PT removed from monitors. 56 This RN calling Dr. Donis Moscoso to discuss plan of care. PT reporting no change in pain after fluids and tylenol. MD says after everything else being ruled out, this is probably just normal pregnancy discomforts. MD recommends PT rests and takes tylenol regularly, and stays hyrdated. 1805 This RN discussing discharge and discharge orders with PT. RN advising PT to stay hydrated, rest, do some stretches at home, and take tylenol for pain. PT agrees and understands and has no further questions at this time. 1812 PT ambulates off unit with significant other,and a steady gait.

## 2023-03-09 NOTE — DISCHARGE INSTRUCTIONS
Rest, take tylenol for pain, stay hydrated. Follow-up in 1 week. Counting Your Baby's Kicks: Care Instructions  Overview     Counting your baby's kicks is one way your doctor can tell that your baby is healthy. Most women--especially in a first pregnancy--feel their baby move for the first time between 16 and 22 weeks. The movement may feel like flutters rather than kicks. Your baby may move more at certain times of the day. When you are active, you may notice less kicking than when you are resting. At your prenatal visits, your doctor will ask whether the baby is active. In your last trimester, your doctor may ask you to count the number of times you feel your baby move. Follow-up care is a key part of your treatment and safety. Be sure to make and go to all appointments, and call your doctor if you are having problems. It's also a good idea to know your test results and keep a list of the medicines you take. How do you count fetal kicks? A common method of checking your baby's movement is to note the length of time it takes to count ten movements (such as kicks, flutters, or rolls). Pick your baby's most active time of day to count. This may be any time from morning to evening. If you don't feel 10 movements in an hour, have something to eat or drink and count for another hour. If you don't feel at least 10 movements in the 2-hour period, call your doctor. When should you call for help? Call your doctor now or seek immediate medical care if:    You noticed that your baby has stopped moving or is moving much less than normal.   Watch closely for changes in your health, and be sure to contact your doctor if you have any problems. Where can you learn more? Go to http://kelle-derian.info/  Enter [id number] in the search box to learn more about \"Counting Your Baby's Kicks: Care Instructions. \"  Current as of: February 23, 2022               Content Version: 13.4  © 4471-4174 Healthwise, Incorporated. Care instructions adapted under license by [employer] (which disclaims liability or warranty for this information). If you have questions about a medical condition or this instruction, always ask your healthcare professional. [address] any warranty or liability for your use of this information. Weeks 32 to 34 of Your Pregnancy: Care Instructions  Decide whether you want to bank or donate your baby's umbilical cord blood. If you want to save this blood, you have to arrange for it ahead of time. Decide about circumcision. Personal, Buddhist, or cultural beliefs may play a role in your decision. You get to decide what you want for your baby. Learn how to ease hemorrhoids. Get more liquids, fruits, vegetables, and fiber in your diet. Avoid sitting for too long. Clean yourself with moist toilet paper. Or try witch hazel pads. Try ice packs or warm sitz baths for discomfort. Use hydrocortisone cream for pain or itching. Ask your doctor about stool softeners. Consider the benefits of breastfeeding. It reduces your baby's risk of sudden infant death syndrome (SIDS).  babies are less likely to get certain infections. And they're less likely to be obese or get diabetes later in life. It can lower your risk of breast and ovarian cancers and osteoporosis. It saves you money. Follow-up care is a key part of your treatment and safety. Be sure to make and go to all appointments, and call your doctor if you are having problems. It's also a good idea to know your test results and keep a list of the medicines you take. Where can you learn more? Go to http://www.[employer].com/  Enter X711 in the search box to learn more about \"Weeks 32 to 34 of Your Pregnancy: Care Instructions. \"  Current as of: February 23, 2022               Content Version: 13.4  © 4572-7985 Healthwise, [employer].    Care instructions adapted under license by [address] (which disclaims liability or warranty for this information). If you have questions about a medical condition or this instruction, always ask your healthcare professional. [address] any warranty or liability for your use of this information.

## 2023-04-27 ENCOUNTER — HOSPITAL ENCOUNTER (EMERGENCY)
Age: 33
Discharge: STILL ADMITTED OR ADMINISTRATIVE | End: 2023-04-27

## 2023-04-27 ENCOUNTER — ANESTHESIA EVENT (OUTPATIENT)
Dept: LABOR AND DELIVERY | Age: 33
DRG: 540 | End: 2023-04-27
Payer: MEDICAID

## 2023-04-27 ENCOUNTER — HOSPITAL ENCOUNTER (INPATIENT)
Age: 33
LOS: 2 days | Discharge: HOME | DRG: 540 | End: 2023-04-29
Attending: STUDENT IN AN ORGANIZED HEALTH CARE EDUCATION/TRAINING PROGRAM | Admitting: STUDENT IN AN ORGANIZED HEALTH CARE EDUCATION/TRAINING PROGRAM
Payer: MEDICAID

## 2023-04-27 ENCOUNTER — ANESTHESIA (OUTPATIENT)
Dept: LABOR AND DELIVERY | Age: 33
DRG: 540 | End: 2023-04-27
Payer: MEDICAID

## 2023-04-27 DIAGNOSIS — Z98.890 STATUS POST SURGERY: Primary | ICD-10-CM

## 2023-04-27 LAB
BASOPHILS # BLD: 0.1 K/UL (ref 0–0.1)
BASOPHILS NFR BLD: 1 % (ref 0–1)
DIFFERENTIAL METHOD BLD: ABNORMAL
EOSINOPHIL # BLD: 0.1 K/UL (ref 0–0.4)
EOSINOPHIL NFR BLD: 1 % (ref 0–7)
ERYTHROCYTE [DISTWIDTH] IN BLOOD BY AUTOMATED COUNT: 18 % (ref 11.5–14.5)
HCT VFR BLD AUTO: 33.9 % (ref 35–47)
HGB BLD-MCNC: 10.3 G/DL (ref 11.5–16)
HISTORY CHECKED?,CKHIST: NORMAL
IMM GRANULOCYTES # BLD AUTO: 0.1 K/UL (ref 0–0.04)
IMM GRANULOCYTES NFR BLD AUTO: 1 % (ref 0–0.5)
LYMPHOCYTES # BLD: 1.7 K/UL (ref 0.8–3.5)
LYMPHOCYTES NFR BLD: 15 % (ref 12–49)
MCH RBC QN AUTO: 22 PG (ref 26–34)
MCHC RBC AUTO-ENTMCNC: 30.4 G/DL (ref 30–36.5)
MCV RBC AUTO: 72.3 FL (ref 80–99)
MONOCYTES # BLD: 0.5 K/UL (ref 0–1)
MONOCYTES NFR BLD: 5 % (ref 5–13)
NEUTS SEG # BLD: 8.6 K/UL (ref 1.8–8)
NEUTS SEG NFR BLD: 77 % (ref 32–75)
NRBC # BLD: 0 K/UL (ref 0–0.01)
NRBC BLD-RTO: 0 PER 100 WBC
PLATELET # BLD AUTO: 251 K/UL (ref 150–400)
PMV BLD AUTO: 11.5 FL (ref 8.9–12.9)
RBC # BLD AUTO: 4.69 M/UL (ref 3.8–5.2)
WBC # BLD AUTO: 11.1 K/UL (ref 3.6–11)

## 2023-04-27 PROCEDURE — 76010000392 HC C SECN EA ADDL 0.5 HR: Performed by: STUDENT IN AN ORGANIZED HEALTH CARE EDUCATION/TRAINING PROGRAM

## 2023-04-27 PROCEDURE — 76060000078 HC EPIDURAL ANESTHESIA: Performed by: STUDENT IN AN ORGANIZED HEALTH CARE EDUCATION/TRAINING PROGRAM

## 2023-04-27 PROCEDURE — 74011000250 HC RX REV CODE- 250: Performed by: NURSE ANESTHETIST, CERTIFIED REGISTERED

## 2023-04-27 PROCEDURE — 77030007866 HC KT SPN ANES BBMI -B: Performed by: NURSE ANESTHETIST, CERTIFIED REGISTERED

## 2023-04-27 PROCEDURE — 76010000391 HC C SECN FIRST 1 HR: Performed by: STUDENT IN AN ORGANIZED HEALTH CARE EDUCATION/TRAINING PROGRAM

## 2023-04-27 PROCEDURE — 4A1HXCZ MONITORING OF PRODUCTS OF CONCEPTION, CARDIAC RATE, EXTERNAL APPROACH: ICD-10-PCS | Performed by: STUDENT IN AN ORGANIZED HEALTH CARE EDUCATION/TRAINING PROGRAM

## 2023-04-27 PROCEDURE — 36415 COLL VENOUS BLD VENIPUNCTURE: CPT

## 2023-04-27 PROCEDURE — 86900 BLOOD TYPING SEROLOGIC ABO: CPT

## 2023-04-27 PROCEDURE — 86923 COMPATIBILITY TEST ELECTRIC: CPT

## 2023-04-27 PROCEDURE — 74011000258 HC RX REV CODE- 258: Performed by: STUDENT IN AN ORGANIZED HEALTH CARE EDUCATION/TRAINING PROGRAM

## 2023-04-27 PROCEDURE — 74011250637 HC RX REV CODE- 250/637: Performed by: STUDENT IN AN ORGANIZED HEALTH CARE EDUCATION/TRAINING PROGRAM

## 2023-04-27 PROCEDURE — 74011250636 HC RX REV CODE- 250/636: Performed by: ANESTHESIOLOGY

## 2023-04-27 PROCEDURE — 75410000003 HC RECOV DEL/VAG/CSECN EA 0.5 HR: Performed by: STUDENT IN AN ORGANIZED HEALTH CARE EDUCATION/TRAINING PROGRAM

## 2023-04-27 PROCEDURE — 74011250636 HC RX REV CODE- 250/636

## 2023-04-27 PROCEDURE — 74011250636 HC RX REV CODE- 250/636: Performed by: STUDENT IN AN ORGANIZED HEALTH CARE EDUCATION/TRAINING PROGRAM

## 2023-04-27 PROCEDURE — 85025 COMPLETE CBC W/AUTO DIFF WBC: CPT

## 2023-04-27 PROCEDURE — 65270000029 HC RM PRIVATE

## 2023-04-27 PROCEDURE — 74011000258 HC RX REV CODE- 258: Performed by: NURSE ANESTHETIST, CERTIFIED REGISTERED

## 2023-04-27 PROCEDURE — 74011250636 HC RX REV CODE- 250/636: Performed by: NURSE ANESTHETIST, CERTIFIED REGISTERED

## 2023-04-27 RX ORDER — SODIUM CHLORIDE, SODIUM LACTATE, POTASSIUM CHLORIDE, CALCIUM CHLORIDE 600; 310; 30; 20 MG/100ML; MG/100ML; MG/100ML; MG/100ML
1000 INJECTION, SOLUTION INTRAVENOUS CONTINUOUS
Status: DISCONTINUED | OUTPATIENT
Start: 2023-04-27 | End: 2023-04-27 | Stop reason: HOSPADM

## 2023-04-27 RX ORDER — KETOROLAC TROMETHAMINE 30 MG/ML
30 INJECTION, SOLUTION INTRAMUSCULAR; INTRAVENOUS
Status: DISCONTINUED | OUTPATIENT
Start: 2023-04-27 | End: 2023-04-27

## 2023-04-27 RX ORDER — SODIUM CHLORIDE 9 MG/ML
250 INJECTION, SOLUTION INTRAVENOUS AS NEEDED
Status: DISCONTINUED | OUTPATIENT
Start: 2023-04-27 | End: 2023-04-28

## 2023-04-27 RX ORDER — NALBUPHINE HYDROCHLORIDE 10 MG/ML
2.5 INJECTION, SOLUTION INTRAMUSCULAR; INTRAVENOUS; SUBCUTANEOUS
Status: ACTIVE | OUTPATIENT
Start: 2023-04-27 | End: 2023-04-28

## 2023-04-27 RX ORDER — OXYTOCIN/RINGER'S LACTATE 30/500 ML
87.3 PLASTIC BAG, INJECTION (ML) INTRAVENOUS AS NEEDED
Status: DISCONTINUED | OUTPATIENT
Start: 2023-04-27 | End: 2023-04-29 | Stop reason: ALTCHOICE

## 2023-04-27 RX ORDER — SODIUM CHLORIDE 0.9 % (FLUSH) 0.9 %
5-40 SYRINGE (ML) INJECTION EVERY 8 HOURS
Status: DISCONTINUED | OUTPATIENT
Start: 2023-04-27 | End: 2023-04-28

## 2023-04-27 RX ORDER — NALOXONE HYDROCHLORIDE 0.4 MG/ML
0.4 INJECTION, SOLUTION INTRAMUSCULAR; INTRAVENOUS; SUBCUTANEOUS AS NEEDED
Status: DISCONTINUED | OUTPATIENT
Start: 2023-04-27 | End: 2023-04-29 | Stop reason: ALTCHOICE

## 2023-04-27 RX ORDER — BUPIVACAINE HYDROCHLORIDE 7.5 MG/ML
INJECTION, SOLUTION INTRASPINAL
Status: SHIPPED | OUTPATIENT
Start: 2023-04-27 | End: 2023-04-27

## 2023-04-27 RX ORDER — METHYLERGONOVINE MALEATE 0.2 MG/ML
INJECTION INTRAVENOUS AS NEEDED
Status: DISCONTINUED | OUTPATIENT
Start: 2023-04-27 | End: 2023-04-27 | Stop reason: HOSPADM

## 2023-04-27 RX ORDER — ACETAMINOPHEN 325 MG/1
650 TABLET ORAL
Status: DISCONTINUED | OUTPATIENT
Start: 2023-04-27 | End: 2023-04-28

## 2023-04-27 RX ORDER — FENTANYL CITRATE 50 UG/ML
INJECTION, SOLUTION INTRAMUSCULAR; INTRAVENOUS
Status: SHIPPED | OUTPATIENT
Start: 2023-04-27 | End: 2023-04-27

## 2023-04-27 RX ORDER — SODIUM CHLORIDE 0.9 % (FLUSH) 0.9 %
5-40 SYRINGE (ML) INJECTION AS NEEDED
Status: DISCONTINUED | OUTPATIENT
Start: 2023-04-27 | End: 2023-04-27 | Stop reason: HOSPADM

## 2023-04-27 RX ORDER — HYDROCODONE BITARTRATE AND ACETAMINOPHEN 5; 325 MG/1; MG/1
1 TABLET ORAL
Status: DISCONTINUED | OUTPATIENT
Start: 2023-04-27 | End: 2023-04-28

## 2023-04-27 RX ORDER — FENTANYL CITRATE 50 UG/ML
INJECTION, SOLUTION INTRAMUSCULAR; INTRAVENOUS AS NEEDED
Status: DISCONTINUED | OUTPATIENT
Start: 2023-04-27 | End: 2023-04-27 | Stop reason: HOSPADM

## 2023-04-27 RX ORDER — ONDANSETRON 2 MG/ML
4 INJECTION INTRAMUSCULAR; INTRAVENOUS
Status: DISCONTINUED | OUTPATIENT
Start: 2023-04-27 | End: 2023-04-29 | Stop reason: ALTCHOICE

## 2023-04-27 RX ORDER — MORPHINE SULFATE 10 MG/ML
10 INJECTION, SOLUTION INTRAMUSCULAR; INTRAVENOUS
Status: DISCONTINUED | OUTPATIENT
Start: 2023-04-27 | End: 2023-04-27

## 2023-04-27 RX ORDER — ONDANSETRON 2 MG/ML
INJECTION INTRAMUSCULAR; INTRAVENOUS AS NEEDED
Status: DISCONTINUED | OUTPATIENT
Start: 2023-04-27 | End: 2023-04-27 | Stop reason: HOSPADM

## 2023-04-27 RX ORDER — SODIUM CHLORIDE, SODIUM LACTATE, POTASSIUM CHLORIDE, CALCIUM CHLORIDE 600; 310; 30; 20 MG/100ML; MG/100ML; MG/100ML; MG/100ML
INJECTION, SOLUTION INTRAVENOUS
Status: DISCONTINUED | OUTPATIENT
Start: 2023-04-27 | End: 2023-04-27 | Stop reason: HOSPADM

## 2023-04-27 RX ORDER — SIMETHICONE 80 MG
80 TABLET,CHEWABLE ORAL
Status: DISCONTINUED | OUTPATIENT
Start: 2023-04-27 | End: 2023-04-29 | Stop reason: HOSPADM

## 2023-04-27 RX ORDER — OXYTOCIN 10 [USP'U]/ML
INJECTION, SOLUTION INTRAMUSCULAR; INTRAVENOUS AS NEEDED
Status: DISCONTINUED | OUTPATIENT
Start: 2023-04-27 | End: 2023-04-27 | Stop reason: HOSPADM

## 2023-04-27 RX ORDER — MORPHINE SULFATE 0.5 MG/ML
INJECTION, SOLUTION EPIDURAL; INTRATHECAL; INTRAVENOUS AS NEEDED
Status: DISCONTINUED | OUTPATIENT
Start: 2023-04-27 | End: 2023-04-27 | Stop reason: HOSPADM

## 2023-04-27 RX ORDER — ZOLPIDEM TARTRATE 5 MG/1
5 TABLET ORAL
Status: DISCONTINUED | OUTPATIENT
Start: 2023-04-27 | End: 2023-04-29 | Stop reason: HOSPADM

## 2023-04-27 RX ORDER — SODIUM CHLORIDE 0.9 % (FLUSH) 0.9 %
5-40 SYRINGE (ML) INJECTION AS NEEDED
Status: DISCONTINUED | OUTPATIENT
Start: 2023-04-27 | End: 2023-04-28

## 2023-04-27 RX ORDER — HYDROMORPHONE HYDROCHLORIDE 1 MG/ML
1 INJECTION, SOLUTION INTRAMUSCULAR; INTRAVENOUS; SUBCUTANEOUS
Status: DISCONTINUED | OUTPATIENT
Start: 2023-04-28 | End: 2023-04-29 | Stop reason: ALTCHOICE

## 2023-04-27 RX ORDER — OXYTOCIN/RINGER'S LACTATE 30/500 ML
10 PLASTIC BAG, INJECTION (ML) INTRAVENOUS AS NEEDED
Status: DISCONTINUED | OUTPATIENT
Start: 2023-04-27 | End: 2023-04-29 | Stop reason: ALTCHOICE

## 2023-04-27 RX ORDER — SODIUM CHLORIDE 0.9 % (FLUSH) 0.9 %
5-40 SYRINGE (ML) INJECTION EVERY 8 HOURS
Status: DISCONTINUED | OUTPATIENT
Start: 2023-04-27 | End: 2023-04-27 | Stop reason: HOSPADM

## 2023-04-27 RX ORDER — SODIUM CHLORIDE, SODIUM LACTATE, POTASSIUM CHLORIDE, CALCIUM CHLORIDE 600; 310; 30; 20 MG/100ML; MG/100ML; MG/100ML; MG/100ML
125 INJECTION, SOLUTION INTRAVENOUS CONTINUOUS
Status: DISCONTINUED | OUTPATIENT
Start: 2023-04-27 | End: 2023-04-29 | Stop reason: ALTCHOICE

## 2023-04-27 RX ORDER — MORPHINE SULFATE 0.5 MG/ML
INJECTION, SOLUTION EPIDURAL; INTRATHECAL; INTRAVENOUS
Status: SHIPPED | OUTPATIENT
Start: 2023-04-27 | End: 2023-04-27

## 2023-04-27 RX ORDER — MORPHINE SULFATE 10 MG/ML
6 INJECTION, SOLUTION INTRAMUSCULAR; INTRAVENOUS
Status: DISCONTINUED | OUTPATIENT
Start: 2023-04-27 | End: 2023-04-27

## 2023-04-27 RX ORDER — HYDROMORPHONE HYDROCHLORIDE 1 MG/ML
1 INJECTION, SOLUTION INTRAMUSCULAR; INTRAVENOUS; SUBCUTANEOUS
Status: DISPENSED | OUTPATIENT
Start: 2023-04-27 | End: 2023-04-28

## 2023-04-27 RX ORDER — BUPIVACAINE HYDROCHLORIDE 7.5 MG/ML
INJECTION, SOLUTION EPIDURAL; RETROBULBAR AS NEEDED
Status: DISCONTINUED | OUTPATIENT
Start: 2023-04-27 | End: 2023-04-27 | Stop reason: HOSPADM

## 2023-04-27 RX ADMIN — HYDROCODONE BITARTRATE AND ACETAMINOPHEN 1 TABLET: 5; 325 TABLET ORAL at 21:35

## 2023-04-27 RX ADMIN — SODIUM CHLORIDE, POTASSIUM CHLORIDE, SODIUM LACTATE AND CALCIUM CHLORIDE 1000 ML: 600; 310; 30; 20 INJECTION, SOLUTION INTRAVENOUS at 09:33

## 2023-04-27 RX ADMIN — HYDROMORPHONE HYDROCHLORIDE 1 MG: 1 INJECTION, SOLUTION INTRAMUSCULAR; INTRAVENOUS; SUBCUTANEOUS at 20:49

## 2023-04-27 RX ADMIN — PHENYLEPHRINE HYDROCHLORIDE 30 MCG/MIN: 10 INJECTION INTRAVENOUS at 10:15

## 2023-04-27 RX ADMIN — HYDROMORPHONE HYDROCHLORIDE 1 MG: 1 INJECTION, SOLUTION INTRAMUSCULAR; INTRAVENOUS; SUBCUTANEOUS at 12:54

## 2023-04-27 RX ADMIN — HYDROCODONE BITARTRATE AND ACETAMINOPHEN 1 TABLET: 5; 325 TABLET ORAL at 14:35

## 2023-04-27 RX ADMIN — FENTANYL CITRATE 15 MCG: 50 INJECTION, SOLUTION INTRAMUSCULAR; INTRAVENOUS at 10:12

## 2023-04-27 RX ADMIN — CEFAZOLIN 3 G: 10 INJECTION, POWDER, FOR SOLUTION INTRAVENOUS at 10:19

## 2023-04-27 RX ADMIN — OXYTOCIN 30 UNITS: 10 INJECTION, SOLUTION INTRAMUSCULAR; INTRAVENOUS at 10:51

## 2023-04-27 RX ADMIN — PHENYLEPHRINE HYDROCHLORIDE 30 MCG/MIN: 10 INJECTION INTRAVENOUS at 10:24

## 2023-04-27 RX ADMIN — BUPIVACAINE HYDROCHLORIDE 1.5 ML: 7.5 INJECTION, SOLUTION EPIDURAL; RETROBULBAR at 10:12

## 2023-04-27 RX ADMIN — BUPIVACAINE HYDROCHLORIDE IN DEXTROSE 5 MG: 7.5 INJECTION, SOLUTION SUBARACHNOID at 10:12

## 2023-04-27 RX ADMIN — SODIUM CHLORIDE, POTASSIUM CHLORIDE, SODIUM LACTATE AND CALCIUM CHLORIDE 1000 ML: 600; 310; 30; 20 INJECTION, SOLUTION INTRAVENOUS at 09:32

## 2023-04-27 RX ADMIN — HYDROMORPHONE HYDROCHLORIDE 1 MG: 1 INJECTION, SOLUTION INTRAMUSCULAR; INTRAVENOUS; SUBCUTANEOUS at 17:17

## 2023-04-27 RX ADMIN — SODIUM CHLORIDE, POTASSIUM CHLORIDE, SODIUM LACTATE AND CALCIUM CHLORIDE: 600; 310; 30; 20 INJECTION, SOLUTION INTRAVENOUS at 10:12

## 2023-04-27 RX ADMIN — MORPHINE SULFATE 150 MCG: 0.5 INJECTION, SOLUTION EPIDURAL; INTRATHECAL; INTRAVENOUS at 10:12

## 2023-04-27 RX ADMIN — ONDANSETRON HYDROCHLORIDE 4 MG: 2 SOLUTION INTRAMUSCULAR; INTRAVENOUS at 10:25

## 2023-04-27 RX ADMIN — MORPHINE SULFATE 0.15 MG: 0.5 INJECTION, SOLUTION EPIDURAL; INTRATHECAL; INTRAVENOUS at 10:12

## 2023-04-27 NOTE — PROGRESS NOTES
0800  Pt comes to unit for scheduled repeat c section, pt oriented to room. 0830  Dr Jolanta Rodriguez in room to discuss POC. 1440  TRANSFER - OUT REPORT:    Verbal/bedside report given to LAURA Rodriguez (name) on Alli Pereira  being transferred to MIU(unit) for routine progression of care       Report consisted of patients Situation, Background, Assessment and   Recommendations(SBAR). Information from the following report(s) SBAR, ED Summary, OR Summary, Procedure Summary, Intake/Output, and MAR was reviewed with the receiving nurse. Lines:   Peripheral IV 04/27/23 Left Forearm (Active)   Site Assessment Clean, dry, & intact 04/27/23 1445   Phlebitis Assessment 0 04/27/23 1445   Infiltration Assessment 0 04/27/23 1445   Dressing Status Clean, dry, & intact 04/27/23 1445   Dressing Type Tape;Transparent 04/27/23 1445       Peripheral IV 04/27/23 Right Antecubital (Active)   Site Assessment Clean, dry, & intact 04/27/23 1445   Phlebitis Assessment 0 04/27/23 1445   Infiltration Assessment 0 04/27/23 1445   Dressing Status Clean, dry, & intact 04/27/23 1445   Dressing Type Tape;Transparent 04/27/23 1445        Opportunity for questions and clarification was provided.       Patient transported with:   Registered Nurse

## 2023-04-27 NOTE — ANESTHESIA POSTPROCEDURE EVALUATION
Post-Anesthesia Evaluation and Assessment    Patient: Dena Slater MRN: [id number]  SSN: [id number]    YOB: 1990  Age: 33 y.o. Sex: female       Cardiovascular Function/Vital Signs  Visit Vitals  /66   Pulse 87   Temp 36.3 °C (97.3 °F)   Resp 16   Ht 5' 2\" (1.575 m)   Wt 124.7 kg (275 lb)   SpO2 99%   BMI 50.30 kg/m²       Patient is status post Spinal anesthesia for Procedure(s):   SECTION. Nausea/Vomiting: None    Postoperative hydration reviewed and adequate. Pain:  Pain Scale 1: Numeric (0 - 10) (23 09)  Pain Intensity 1: 0 (23 09)   Managed    Neurological Status:   Neuro (WDL): Within Defined Limits (23 09)   At baseline    Mental Status and Level of Consciousness: Alert and oriented to person, place, and time    Pulmonary Status:   O2 Device: None (23 1143)   Adequate oxygenation and airway patent    Complications related to anesthesia: None    Post-anesthesia assessment completed. No concerns    Signed By: Lucie Dubin, MD     2023              Procedure(s):   SECTION. spinal    <BSHSIANPOST>    INITIAL Post-op Vital signs:   Vitals Value Taken Time   /66 23 1328   Temp 36.3 °C (97.3 °F) 23 1143   Pulse 84 23 1329   Resp 16 23 1143   SpO2 91 % 23 1329   Vitals shown include unvalidated device data.

## 2023-04-27 NOTE — ANESTHESIA PREPROCEDURE EVALUATION
Anesthetic History   No history of anesthetic complications            Review of Systems / Medical History  Patient summary reviewed, nursing notes reviewed and pertinent labs reviewed    Pulmonary            Asthma : well controlled       Neuro/Psych   Within defined limits           Cardiovascular  Within defined limits                Exercise tolerance: >4 METS  Comments: Not on beta blocker   GI/Hepatic/Renal  Within defined limits              Endo/Other      Hypothyroidism (no longer taking meds)  Morbid obesity     Other Findings              Physical Exam    Airway  Mallampati: II  TM Distance: 4 - 6 cm  Neck ROM: normal range of motion   Mouth opening: Normal     Cardiovascular  Regular rate and rhythm,  S1 and S2 normal,  no murmur, click, rub, or gallop  Rhythm: regular  Rate: normal         Dental  No notable dental hx       Pulmonary  Breath sounds clear to auscultation               Abdominal  GI exam deferred       Other Findings            Anesthetic Plan    ASA: 3  Anesthesia type: spinal          Induction: Intravenous  Anesthetic plan and risks discussed with: Patient

## 2023-04-27 NOTE — OP NOTES
Operative Note    Patient: Allison Thornton  YOB: 1990  MRN: [id number]    Date of Procedure: 2023     Pre-Op Diagnosis: Scheduled repeat , 39 weeks intrauterine gestation     Post-Op Diagnosis: Same as preoperative diagnosis. Procedure(s):   SECTION    Surgeon(s):  Delila Najjar, MD Debria Moors, MD    Surgical Assistant: None    Anesthesia: Spinal     Estimated Blood Loss (mL):  913    Complications: None    Specimens: * No specimens in log *     Implants: * No implants in log *    Drains: * No LDAs found *    Findings: Extensive scarring of the fascia to the rectus muscle and subcutaneous fatty layer, rock solid rectus muscles with minimal streching capability, omental adhesions to the anterior uterine wall, filmy adhesions between the bladder dome and the lower uterine segment as well as dense adherent plane between the bladder and the lower uterine segment, normal appearing bilateral fallopian tubes and ovaries     Comments: The patient was taken to the operating room with all consents signed and placed in chart as in previous note. Spinal anesthesia was placed and found to be adequate. A richards catheter was placed and found to be draining clear urine to gravity. She was given pre-operative Ancef 3 grams, placed in supine position with a left lateral tilt, and prepped and draped in the normal sterile fashion. Time out was performed. An Allis test was performed and found to be adequate. Peds team awaiting at bedside. The scalpel was used to make a pfannenstiel incision. Bovie cautery was used to assist with hemostasis. The scalpel was carried down to the level of the fascia, and the subcutaneous fatty tissue was not able to be bluntly  from the fascia so was cut through with the scalpel until the fascia was more clearly identified. .     The fascial incision was extended laterally in both directions with Gilbert scissors and pickups with teeth.  This was done carefully and in layers as the fascia and rectus muscles were adherent to each other and this was more difficult than normal to develop. Attention was then paid to the inferior aspect of the fascia. The inferior aspect of the fascia was grasped with Kocher clamps and was dissected off the underlying rectus and pyramidalis muscles sharply and carefully until a good plane was developed. This was done with the scalpel and there was no give with blunt dissection at all. Attention was then paid to the superior aspect of the fascia, which was dissected off the underlying rectus muscles in a similar fashion. The fascia was more densely adhered to the rectus superiorly, and it had to be taken down with Gilbert scissors as well. The rectus muscles were already  at the midline bluntly, and we were already in the peritoneum. And the omentum appeared to be peaking through. The ometum was adhered to the anterior uterine wall with filmy adhesions. The omentum was brushed back with a lap pad gently and the adhesions were taken down this way. The lap was tagged and this was used to hold the omentum back. The rectus was again not able to be stretched, and no further plane was able to be developed between the fascia or the msucle, So the rectus had to be cut with the bovie bilaterally at the superior portion of the visible rectus. I placed my finger under neath the rectus to ensure that there was no bowel underneath and I cut the msucle on the left side on cautery. No bleeding noted. I did the same on the right side. Then, the rectus was able to be stretched. An Freddy retractor was not able to be inserted. The bladder had filmy adhesions up high to the anterior uterine wall. The bladder dome was also very densely adhered to the lower uterine segment.  I took down the filmy adhesions with a Metzenbaum scissors but it was too risky to take down the bladder dense adhesions from the lower uterine segment. I made the decision to make the incision higher, but it was still within the lower uterine segment. A low transverse hysterotomy was then made, and the fetus was delivered with gentle fundal pressure. The placenta delivered intact spontaneously with fundal massage and gentle cord traction. The uterus was wiped clean of all remaining membranes with two dry laps. The hysterotomy was closed with a 0-Vicryl in a running locked layer. During the closure it was noted that a portion of the endometrium was extruding from the incision (hysterotomy) on the right side. I tucked this in with my running closure, however, the patient is probably at risk of a  scar ectopic or an accreta if she has an anteior placenta. The vicryl was ripping through the serosa and causing bleeding so a 1-0 chromic was used to reinforce the bleeding portions of the hysterotomy. Arrista powder used. Good hemostasis noted. TTe gutters were cleaned of all debris. The tubes and ovaries were inspected and noted to be normal appearing. The fascia was closed with a 1-0 Looped PDS in a running fashion. During closure of the fascia it was again ntoed that the subcutaneous layer, rectus, and fascia were not distinguishable the entire length of the incision and were fused in some portions, so we did take larger bits of the fascia. The subcutaneous layer was closed with a 2-0 plain gut. The skin was closed with a 3-0 Monocryl on a PS2. Dermabond was applied. The uterus was expressed with minimal output. The patient tolerated the procedure well. Mother and baby stable at bedside and transferred to [facility] in stable condition. All counts correct x 2.      J Luis Clarke MD  [facility]       Electronically Signed by J Luis Clarke MD on 2023 at 2:19 PM

## 2023-04-27 NOTE — H&P
Late entry     History & Physical    Name: Radha Rothman MRN: [id number]  SSN: [id number]    YOB: 1990  Age: 33 y.o. Sex: female        Subjective:     Estimated Date of Delivery: 23  OB History          9    Para   4    Term   4            AB   4    Living   4         SAB   4    IAB        Ectopic        Molar        Multiple   0    Live Births   4                Ms. Ivania Amaya is admitted with pregnancy at 39w4d for scheduled rpt . Past Medical History:   Diagnosis Date    Abnormal Papanicolaou smear of cervix     colpo    Acquired hypothyroidism     HX hypothyroidism since 16, not on medication because she cannot find MD    Anxiety and depression     Asthma     Borderline schizophrenia ([facility] [address]     Gastritis      Past Surgical History:   Procedure Laterality Date    HX LEEP PROCEDURE  2021    HX WISDOM TEETH EXTRACTION      OK  DELIVERY ONLY       - , , ,      Social History     Occupational History    Not on file   Tobacco Use    Smoking status: Former     Packs/day: 2.00     Years: 17.00     Pack years: 34.00     Types: Cigarettes     Quit date:      Years since quitting: 3.3    Smokeless tobacco: Never   Vaping Use    Vaping Use: Former   Substance and Sexual Activity    Alcohol use: Never     Alcohol/week: 2.0 - 3.0 standard drinks     Types: 2 Shots of liquor per week    Drug use: Never    Sexual activity: Yes     Partners: Male     Birth control/protection: None     History reviewed. No pertinent family history. Allergies   Allergen Reactions    Latex Hives    Advil [Ibuprofen] Hives    Apple Hives     Prior to Admission medications    Medication Sig Start Date End Date Taking? Authorizing Provider   PNV Comb #2-Iron-Omega 3-FA 72-9-423-200 mg cmpk Take  by mouth. Yes Provider, Historical   ALBUTEROL IN Take 1-2 Puffs by inhalation as needed.     Provider, Historical        Review of Systems: A comprehensive review of systems was negative except for that written in the HPI. Objective:     Vitals:  Vitals:    23 1158 23 1245 23 1258 23 1328   BP: 116/69 114/61 117/73 105/66   Pulse: 77 78 87    Resp:       Temp:       SpO2: 98%  99%    Weight:       Height:            Physical Exam:  Abdomen: soft, nontender  EFW 8.5#  Membranes:  Intact  Fetal Heart Rate: Reactive    Prenatal Labs:   Lab Results   Component Value Date/Time    Rubella, External Immune 2015 12:00 AM    HBsAg, External negative 2015 12:00 AM    HIV, External negative 2015 12:00 AM    RPR, External Nonreactive 2014 09:30 AM    Gonorrhea, External negative 2015 12:00 AM    Chlamydia, External negative 2015 12:00 AM        Assessment/Plan:     Plan: Admit for scheduled rpt    O pos / GBS neg on  in office  Consented pt for . Discussed risks including but not limited to infection, blood loss, injury, need for blood transfusion. Discussed in great detail how she is high risk of scar tissue as well as bladder/bowel injury, needing surgical repair of this, and needing a richards for a prolonged period of time. The pt consented to proceed.      Signed By:  Heidy Silbey MD     2023

## 2023-04-27 NOTE — LACTATION NOTE
This note was copied from a baby's chart. This is mother's 5th baby - she tried to breastfeed her other children for a few days. Discussed with mother her plan for feeding. Reviewed the benefits of exclusive breast milk feeding during the hospital stay. Informed her of the risks of using formula to supplement in the first few days of life as well as the benefits of successful breast milk feeding; referred her to the Breastfeeding booklet about this information. She acknowledges understanding of information reviewed and states that it is her plan to breastfeed/formula feed her infant. Will support her choice and offer additional information as needed. Encouraged mom to attempt feeding with baby led feeding cues. Just as sucking on fingers, rooting, mouthing. Looking for 8-12 feedings in 24 hours. Don't limit baby at breast, allow baby to come of breast on it's own. Baby may want to feed  often and may increase number of feedings on second day of life. Skin to skin encouraged. If baby doesn't nurse,  Mom should  hand express  10-20 drops of colostrum and drip into baby's mouth, or give to baby by finger feeding, cup feeding, or spoon feeding at least every 2-3 hours. Mother will successfully establish breastfeeding by feeding in response to early feeding cues   or wake every 3h, will obtain deep latch, and will keep log of feedings/output. Taught to BF at hunger cues and or q 2-3 hrs and to offer 10-20 drops of hand expressed colostrum at any non-feeds. Breast Assessment  Left Breast: Extra large  Left Nipple: Everted, Intact  Right Breast: Extra large  Right Nipple: Everted, Intact  Breast- Feeding Assessment  Attends Breast-Feeding Classes: No  Breast-Feeding Experience: No (This is mother's 5th baby. She tried for a few days with her other children.  Mother states her new baby has been latching on well.)  Breast Trauma/Surgery: No  Type/Quality: Good (Per mother)  Lactation Consultant Visits  Breast-Feedings: Good  (Baby last breast fed at 1500 for 5 minutes on right breast then fell asleep.)      Breastfeeding handouts / information to get a breast pump and LC# given.

## 2023-04-27 NOTE — ROUTINE PROCESS
SBAR IN Report: Mother    Verbal report received from Yesenia Rm RN (full name & credentials) on this patient, who is now being transferred from  and D (unit) for routine progression of care. The patient is not wearing a green \"Anesthesia-Duramorph\" band. Report consisted of patient's Situation, Background, Assessment and Recommendations (SBAR). [place] ID bands were compared with the identification form, and verified with the patient and transferring nurse. Information from the SBAR, Kardex, Intake/Output, and MAR and the Елена Report was reviewed with the transferring nurse; opportunity for questions and clarification provided.

## 2023-04-27 NOTE — BRIEF OP NOTE
Brief Postoperative Note    Patient: Alessandro Land  YOB: 1990  MRN: [id number]    Date of Procedure: 2023     Pre-Op Diagnosis: Repeat      Post-Op Diagnosis: Same    Procedure(s):   SECTION    Surgeon(s):  MD Sveta Kimbrough MD    Surgical Assistant: None    Anesthesia: Spinal     Estimated Blood Loss (mL): 240    Complications: None    Specimens: * No specimens in log *     Implants: * No implants in log *    Drains: * No LDAs found *    Findings: Extensive scar tissue, to be further detailed in op report, no suspicion of bladder or bowel injury    Electronically Signed by Yanique Motta MD on 2023 at 11:34 AM

## 2023-04-27 NOTE — DISCHARGE SUMMARY
Obstetrical Discharge Summary     Name: Hamilton Moore MRN: [id number]  SSN: [id number]    YOB: 1990  Age: 33 y.o. Sex: female      Admit Date: 2023    Discharge Date: 2023      Attending Physician:  Alisha Sexton MD     Delivering Physician:  Alisha Sexton MD; Bri Turcios MD     * Admission Diagnoses:   IUP @ 66U4N  Repeat       * Discharge Diagnoses:   Delivery of a VMI via rltcd by nel Breaux and Nadine Mello MD on 2023. Apgars were 9 and 9. Same a above       Additional Diagnoses:   Hospital Problems as of 2023 Date Reviewed: 2023            Codes Class Noted - Resolved POA    Labor and delivery indication for care or intervention ICD-10-CM: O75.9  ICD-9-CM: 659.90  2023 - Present Unknown          Lab Results   Component Value Date/Time    Rubella, External Immune 10/12/2022 12:00 AM      Immunization History   Administered Date(s) Administered    MMR 2014    Tdap 2014       * Procedures:   rltcd         * Discharge Condition: good    * Hospital Course: Normal hospital course following the delivery. * Disposition: Home    Discharge Medications:   Current Discharge Medication List          * Follow-up Care/Patient Instructions:   Activity: Activity as tolerated  Diet: Regular Diet  Wound Care: As directed  Followup 6 weeks for PP check        Signed By:  Belvia Curling, MD     2023

## 2023-04-27 NOTE — ANESTHESIA PROCEDURE NOTES
Spinal Block    Start time: 4/27/2023 8:09 AM  End time: 4/27/2023 10:12 AM  Performed by: Jaz Pereira CRNA  Authorized by: Jaz Pereira CRNA     Pre-procedure: Indications: primary anesthetic  Preanesthetic Checklist: patient identified, risks and benefits discussed, anesthesia consent, site marked, patient being monitored, timeout performed and fire risk safety assessment completed and verbalized      Spinal Block:   Patient Position:  Seated  Prep Region:  Lumbar  Prep: DuraPrep      Location:  L3-4  Technique:  Single shot  Local: bupivacaine 0.75% in dextrose 8.25% preserv-free (SENSORCAINE) Intrathecal - Intrathecal, Back   5 mg - 4/27/2023 10:12:00 AM  fentaNYL citrate (PF) Intrathecal - Intrathecal, Back   15 mcg - 4/27/2023 10:12:00 AM  morphine (PF) (DURAMORPH;ASTRAMORPH) 0.5 mg/mL injection - Intrathecal, Back   0.15 mg - 4/27/2023 10:12:00 AM  Local Dose (mL):  3  Med Admin Time: 4/27/2023 10:12 AM    Needle:   Needle Type:   Colton  Needle Gauge:  25 G  Attempts:  1      Events: CSF confirmed        Assessment:  Insertion:  Uncomplicated  Patient tolerance:  Patient tolerated the procedure well with no immediate complications

## 2023-04-28 LAB
ABO + RH BLD: NORMAL
BASOPHILS # BLD: 0 K/UL (ref 0–0.1)
BASOPHILS NFR BLD: 0 % (ref 0–1)
BLD PROD TYP BPU: NORMAL
BLOOD GROUP ANTIBODIES SERPL: NORMAL
BPU ID: NORMAL
CROSSMATCH RESULT,%XM: NORMAL
DIFFERENTIAL METHOD BLD: ABNORMAL
EOSINOPHIL # BLD: 0.1 K/UL (ref 0–0.4)
EOSINOPHIL NFR BLD: 1 % (ref 0–7)
ERYTHROCYTE [DISTWIDTH] IN BLOOD BY AUTOMATED COUNT: 17.6 % (ref 11.5–14.5)
HCT VFR BLD AUTO: 27.9 % (ref 35–47)
HGB BLD-MCNC: 8.4 G/DL (ref 11.5–16)
IMM GRANULOCYTES # BLD AUTO: 0.1 K/UL (ref 0–0.04)
IMM GRANULOCYTES NFR BLD AUTO: 1 % (ref 0–0.5)
LYMPHOCYTES # BLD: 1.1 K/UL (ref 0.8–3.5)
LYMPHOCYTES NFR BLD: 10 % (ref 12–49)
MCH RBC QN AUTO: 21.9 PG (ref 26–34)
MCHC RBC AUTO-ENTMCNC: 30.1 G/DL (ref 30–36.5)
MCV RBC AUTO: 72.7 FL (ref 80–99)
MONOCYTES # BLD: 0.5 K/UL (ref 0–1)
MONOCYTES NFR BLD: 4 % (ref 5–13)
NEUTS SEG # BLD: 8.9 K/UL (ref 1.8–8)
NEUTS SEG NFR BLD: 84 % (ref 32–75)
NRBC # BLD: 0 K/UL (ref 0–0.01)
NRBC BLD-RTO: 0 PER 100 WBC
PLATELET # BLD AUTO: 218 K/UL (ref 150–400)
PMV BLD AUTO: 11.1 FL (ref 8.9–12.9)
RBC # BLD AUTO: 3.84 M/UL (ref 3.8–5.2)
SPECIMEN EXP DATE BLD: NORMAL
STATUS OF UNIT,%ST: NORMAL
UNIT DIVISION, %UDIV: 0
WBC # BLD AUTO: 10.5 K/UL (ref 3.6–11)

## 2023-04-28 PROCEDURE — 65270000029 HC RM PRIVATE

## 2023-04-28 PROCEDURE — 74011250636 HC RX REV CODE- 250/636: Performed by: ANESTHESIOLOGY

## 2023-04-28 PROCEDURE — 85025 COMPLETE CBC W/AUTO DIFF WBC: CPT

## 2023-04-28 PROCEDURE — 74011250637 HC RX REV CODE- 250/637: Performed by: OBSTETRICS & GYNECOLOGY

## 2023-04-28 PROCEDURE — 36415 COLL VENOUS BLD VENIPUNCTURE: CPT

## 2023-04-28 RX ORDER — HYDROCODONE BITARTRATE AND ACETAMINOPHEN 5; 325 MG/1; MG/1
2 TABLET ORAL
Status: DISCONTINUED | OUTPATIENT
Start: 2023-04-28 | End: 2023-04-28

## 2023-04-28 RX ORDER — HYDROMORPHONE HYDROCHLORIDE 2 MG/1
1 TABLET ORAL
Status: COMPLETED | OUTPATIENT
Start: 2023-04-28 | End: 2023-04-28

## 2023-04-28 RX ORDER — OXYCODONE AND ACETAMINOPHEN 5; 325 MG/1; MG/1
2 TABLET ORAL
Status: DISCONTINUED | OUTPATIENT
Start: 2023-04-28 | End: 2023-04-29 | Stop reason: HOSPADM

## 2023-04-28 RX ORDER — OXYCODONE AND ACETAMINOPHEN 5; 325 MG/1; MG/1
1 TABLET ORAL
Status: DISCONTINUED | OUTPATIENT
Start: 2023-04-28 | End: 2023-04-29 | Stop reason: HOSPADM

## 2023-04-28 RX ADMIN — HYDROMORPHONE HYDROCHLORIDE 1 MG: 2 TABLET ORAL at 21:30

## 2023-04-28 RX ADMIN — HYDROCODONE BITARTRATE AND ACETAMINOPHEN 2 TABLET: 5; 325 TABLET ORAL at 06:23

## 2023-04-28 RX ADMIN — OXYCODONE AND ACETAMINOPHEN 2 TABLET: 5; 325 TABLET ORAL at 22:56

## 2023-04-28 RX ADMIN — OXYCODONE AND ACETAMINOPHEN 2 TABLET: 5; 325 TABLET ORAL at 18:53

## 2023-04-28 RX ADMIN — HYDROCODONE BITARTRATE AND ACETAMINOPHEN 2 TABLET: 5; 325 TABLET ORAL at 10:42

## 2023-04-28 RX ADMIN — HYDROCODONE BITARTRATE AND ACETAMINOPHEN 2 TABLET: 5; 325 TABLET ORAL at 15:07

## 2023-04-28 RX ADMIN — HYDROCODONE BITARTRATE AND ACETAMINOPHEN 2 TABLET: 5; 325 TABLET ORAL at 01:36

## 2023-04-28 NOTE — LACTATION NOTE
This note was copied from a baby's chart. Mother states baby has been breastfeeding well. She breast fed her baby at 0900 for 10 minutes. Encouraged mother to call [facility] for breastfeeding assistance. Reviewed breastfeeding basics:  Supply and demand,  stomach size, early  Feeding cues, skin to skin, positioning and baby led latch-on, assymetrical latch with signs of good, deep latch vs shallow, feeding frequency and duration, and log sheet for tracking infant feedings and output. Breastfeeding Booklet and Warm line information given. Discussed typical  weight loss and the importance of infant weight checks with pediatrician 1-2 post discharge. Discussed what to do if she gets engorged:  Engorgement Care Guidelines:  Reviewed how milk is made and normal phases of milk production. Taught care of engorged breasts - physiologic breastfeeding encouraged with use of cool packs (no ice directly on skin). Consider use of NSAIDS where appropriate for discomfort and inflammation. Can employ light touch, lymphatic drainage techniques on tender grandular tissues. Anticipatory guidance shared. Mother will successfully establish breastfeeding by feeding in response to early feeding cues   or wake every 3h, will obtain deep latch, and will keep log of feedings/output. Taught to BF at hunger cues and or q 2-3 hrs and to offer 10-20 drops of hand expressed colostrum at any non-feeds. Breast Assessment  Left Breast: Extra large  Left Nipple: Everted, Intact  Right Breast: Extra large  Right Nipple: Everted, Intact  Breast- Feeding Assessment  Attends Breast-Feeding Classes: No  Breast-Feeding Experience: No (This is mother's 5th baby. She tried for a few days with her other children.  Mother states her new baby has been latching on well.)  Breast Trauma/Surgery: No  Type/Quality: Good  Lactation Consultant Visits  Breast-Feedings: Good  (Mother states baby recently breast fed at 0900 for 10 minutes.)

## 2023-04-28 NOTE — PROGRESS NOTES
PostPartum Note    Yulisa Noel  [id number]  1990  33 y.o.    S:  Ms. Yulisa Noel is a 33 y.o.  POD #1 s/p LTCS @ 39w4d. Doing well. She had a baby boy. Her lochia is like a period. She describes her pain as mild and is well controlled with PO medications. She is ambulating and voiding. Tolerating PO intake. Passing flatus. O:   Visit Vitals  BP 97/66   Pulse 91   Temp 98.2 °F (36.8 °C)   Resp 16   Ht 5' 2\" (1.575 m)   Wt 124.7 kg (275 lb)   LMP  (LMP Unknown)   SpO2 98%   Breastfeeding Unknown   BMI 50.30 kg/m²       Lab Results   Component Value Date/Time    WBC 10.5 2023 03:55 AM    HGB 8.4 (L) 2023 03:55 AM    HCT 27.9 (L) 2023 03:55 AM    PLATELET 949 15/13/6021 03:55 AM    MCV 72.7 (L) 2023 03:55 AM    Hgb, External 11.7 2015 12:00 AM    Hct, External 35.6 2015 12:00 AM    Platelet cnt., External 239 2015 12:00 AM       Gen - No acute distress  Abdomen - Fundus firm, below the umbilicus, incision clean/dry/intact  Ext - Warm, well perfused. Nontender    A/P:  POD #1 s/p LTCS @ 39w4d doing well. 1.  Routine PP instructions/ care discussed  2. Blood type - Rh postiive  3. Discharge POD2 or 3   4. F/U 4-6 weeks for PP check.       Tiago Jenkins MD  [facility]

## 2023-04-28 NOTE — PROGRESS NOTES
04/28/23 0730 Report recevied from L. Canary Duverney, RN. Assumed care of patient at this time. Darrian Siddiqui [facility] hospitalist Dr. Abner Brown to report patients pain level. Patient reports pain 8/10 with no relief from the current medications. Patient reports that percocet works well for her on her last csection. Left message for Dr. Abner Brown to return call. [address] Dr. Abner Brown called back. She will change over orders for the patient in her medication record.

## 2023-04-28 NOTE — ROUTINE PROCESS
0110 - Patients IV infiltrated. RN reached out to [place] [facility] [facility] Hospitalist, Dr. Magnus Cuellar to see if it was okay to up PO Norco to 2 tablets, Q4H. Per Dr. Magnus Cuellar, that is okay.

## 2023-04-29 VITALS
DIASTOLIC BLOOD PRESSURE: 80 MMHG | RESPIRATION RATE: 16 BRPM | HEART RATE: 102 BPM | OXYGEN SATURATION: 98 % | HEIGHT: 62 IN | BODY MASS INDEX: 50.61 KG/M2 | WEIGHT: 275 LBS | TEMPERATURE: 99 F | SYSTOLIC BLOOD PRESSURE: 116 MMHG

## 2023-04-29 PROCEDURE — 74011250637 HC RX REV CODE- 250/637: Performed by: STUDENT IN AN ORGANIZED HEALTH CARE EDUCATION/TRAINING PROGRAM

## 2023-04-29 PROCEDURE — 74011250637 HC RX REV CODE- 250/637: Performed by: OBSTETRICS & GYNECOLOGY

## 2023-04-29 RX ORDER — OXYCODONE AND ACETAMINOPHEN 5; 325 MG/1; MG/1
1 TABLET ORAL
Qty: 15 TABLET | Refills: 0 | Status: SHIPPED | OUTPATIENT
Start: 2023-04-29 | End: 2023-05-02

## 2023-04-29 RX ADMIN — SIMETHICONE 80 MG: 80 TABLET, CHEWABLE ORAL at 09:07

## 2023-04-29 RX ADMIN — SIMETHICONE 80 MG: 80 TABLET, CHEWABLE ORAL at 14:20

## 2023-04-29 RX ADMIN — OXYCODONE AND ACETAMINOPHEN 2 TABLET: 5; 325 TABLET ORAL at 09:07

## 2023-04-29 RX ADMIN — OXYCODONE AND ACETAMINOPHEN 2 TABLET: 5; 325 TABLET ORAL at 14:20

## 2023-04-29 RX ADMIN — OXYCODONE AND ACETAMINOPHEN 2 TABLET: 5; 325 TABLET ORAL at 04:40

## 2023-04-29 NOTE — PROGRESS NOTES
PostPartum Note    Alaina Villeda  [id number]  1990  33 y.o.    S:  Ms. Alaina Villeda is a 33 y.o.  POD #2 s/p LTCS @ 39w4d. Doing well. She had a baby boy. Her lochia is like a period. She describes her pain as mild and is well controlled with PO medications. She is breast feeding and this is going well. She is ambulating and voiding. Tolerating PO intake. O:   Visit Vitals  /80 (BP Patient Position: At rest)   Pulse (!) 102   Temp 99 °F (37.2 °C)   Resp 16   Ht 5' 2\" (1.575 m)   Wt 124.7 kg (275 lb)   LMP  (LMP Unknown)   SpO2 98%   Breastfeeding Unknown   BMI 50.30 kg/m²       Lab Results   Component Value Date/Time    WBC 10.5 2023 03:55 AM    HGB 8.4 (L) 2023 03:55 AM    HCT 27.9 (L) 2023 03:55 AM    PLATELET 378  03:55 AM    MCV 72.7 (L) 2023 03:55 AM    Hgb, External 11.7 2015 12:00 AM    Hct, External 35.6 2015 12:00 AM    Platelet cnt., External 239 2015 12:00 AM       Gen - No acute distress  Abdomen - Fundus firm, below the umbilicus ; incision c/d/I   Ext - Warm, well perfused. Nontender    A/P:  POD #2 s/p LTCS @ 39w4d doing well. 1.  Routine PP instructions/ care discussed  2. Blood type - Rh +  3. Rubella imm  4. Circumcision done    5. Discharge today as is meeting criteria. 6.  F/U 4-6 weeks for PP check.       Nigel Greene MD  [facility]

## 2023-04-29 NOTE — ROUTINE PROCESS
Patient off unit in stable condition via wheelchair with volunteers for discharge home per  MD.  Patient is to follow up in 4-6 weeks and is aware. Prescriptions called to patients pharmacy. Patient denies H/A, dizziness, nausea and or vomiting or pain at this time. Infant in car seat with mom.

## 2023-04-29 NOTE — DISCHARGE INSTRUCTIONS
Discharge Instructions for  Section    Patient ID:  Ellis Núñez  [id number]  39 y.o.  1990    Continue taking your prenatal vitamins if you are breastfeeding. Follow-up care is a key part of your treatment and safety. Be sure to keep all your scheduled appointments    Activity    Avoid heavy lifting greater than 10lbs for 2 weeks after your surgery  Pelvic rest for 6 weeks, ie, nothing in your vagina for 6 weeks  (no intercourse, tampons, or douching). No tubs for 6 weeks. No driving for 2 weeks and until you are no longer taking prescription pain medications and you can put your foot on the break in a hurry    Limit climbing stairs to only when necessary the first 1-2 weeks. Hold the railing and do not carry your baby up and downstairs at first for safety   You may walk as tolerated, though be care to not over-do it. Walking will assist in overall healing, decrease constipation and bloating. Chyrl Must feel better more quickly with some daily movement. Diet  Regular diet as tolerated    Wound care  There are several types of incision closures. 1.    If you have Dermabond, or skin glue, covering your incision, it will fall off slowly in the next few weeks. You may remove it as it begins to peel off. Additional wound care  Clear or reddish drainage from your incision is normal.  It's best to leave it open to the air, but if there is drainage, you may cover the incision lightly with gauze, preferably without tape. 2.   Keep the incision clean and dry. 3.  Numbness of the skin at or around your incision is normal and the feeling usually returns gradually. 4.  Call your doctor if you have increasing drainage from your incision, an unpleasant odor, red streaks, an increase in pain, or if it appears to open.     Pain Management    Continue prescription pain medication as written by discharging physician  Over the counter medications such as Tylenol and ibuprofen (Motrin or Advil) are also ideal.  These may be taken together or in alternating doses. You may  take the maximum dose:  Motrin or Advil (generic ibuprofen), either 3 tablets every 6 hours or 4 tablets every 8 hours. Your prescription medication conntains a narcotic mixed with Tylenol,so  you should not take any extra Tylenol or acetaminophen until you have reduced your prescription pain medication. The maximum dose is Tylenol or acetominophen 1000 mg every 6 hours (equivalent to 2 Extra Strength Tylenols every 6 hours). After a few days, begin to replace the prescription medication with over the counter medications. Use the prescription medication if needed for more severe pain or at night. The prescription medication can be addictive if overused. Add heating pad or sitz baths as needed. Constipation    Constipation is normal after surgery, especially while taking prescription narcotic pain medication. Over the counter remedies including ducosate (Colace), take 1-2 capsules 1-2 times daily for soft stool as needed. You may also add/ try milk of magnesia or rectal remedies such as Dulcolax or Fleets enema. Recovery: What to Expect at Home    Fatigue is expected. Try to rest when you can and don't worry about doing housework or other tasks which can wait. The soreness in your incision will improve significantly over the first 2 weeks, but it may take 6-8 weeks before you are completely recovered. Back pain or general body aches or muscle soreness are expected and should improve with acetominophen or ibuprofen. Leg swelling due to pregnancy and/or IV fluids given in the hospital will take about two weeks to resolve. Most women experience some form of the \"Baby Blues\" after having a baby. Feeling emotional, tearful, frustrated, anxious, sad, and irritable some of the time is normal and go away after about 2 weeks. Adequate rest and help from your family will help. Take breaks from caring for the baby. Call your doctor if your symptoms seem severe, last more than 2 weeks, or seem to be getting worse instead of better. Get help immediately if you have thoughts of wanting to hurt yourself or others! Call your doctor or seek immediate medical care if you have:  Heavy vaginal bleeding, soaking through one or more pads an hour for several hours. Foul-smelling discharge from your vagina or incision. Consistent nausea and vomiting and cannot keep fluids down. Consistent pain that does not get better after you take pain medicine.   Sudden chest pain and shortness of breath  Signs of a blood clot: pain/ swelling/ increasing redness in your lower extremeties  Signs of infection: increased pain in your abdomen or vaginal area; red streaks, warmth, or tenderness of your breasts; fever of 100.5 F or greater

## 2024-11-07 ENCOUNTER — APPOINTMENT (OUTPATIENT)
Facility: HOSPITAL | Age: 34
End: 2024-11-07
Payer: MEDICAID

## 2024-11-07 ENCOUNTER — HOSPITAL ENCOUNTER (EMERGENCY)
Facility: HOSPITAL | Age: 34
Discharge: HOME | End: 2024-11-07
Payer: MEDICAID

## 2024-11-07 VITALS
BODY MASS INDEX: 41.96 KG/M2 | DIASTOLIC BLOOD PRESSURE: 73 MMHG | HEIGHT: 62 IN | HEART RATE: 89 BPM | OXYGEN SATURATION: 100 % | TEMPERATURE: 98.3 F | WEIGHT: 228 LBS | RESPIRATION RATE: 16 BRPM | SYSTOLIC BLOOD PRESSURE: 132 MMHG

## 2024-11-07 DIAGNOSIS — B37.9 YEAST INFECTION: ICD-10-CM

## 2024-11-07 DIAGNOSIS — Z3A.20 20 WEEKS GESTATION OF PREGNANCY: Primary | ICD-10-CM

## 2024-11-07 DIAGNOSIS — R10.9 ABDOMINAL PAIN, UNSPECIFIED ABDOMINAL LOCATION: ICD-10-CM

## 2024-11-07 LAB
ALBUMIN SERPL-MCNC: 2.9 G/DL (ref 3.5–5)
ALBUMIN/GLOB SERPL: 0.7 (ref 1.1–2.2)
ALP SERPL-CCNC: 71 U/L (ref 45–117)
ALT SERPL-CCNC: 13 U/L (ref 12–78)
ANION GAP SERPL CALC-SCNC: 9 MMOL/L (ref 2–12)
APPEARANCE UR: CLEAR
AST SERPL W P-5'-P-CCNC: 9 U/L (ref 15–37)
BACTERIA URNS QL MICRO: NEGATIVE /HPF
BASOPHILS # BLD: 0 K/UL (ref 0–0.1)
BASOPHILS NFR BLD: 0 % (ref 0–1)
BILIRUB SERPL-MCNC: 0.2 MG/DL (ref 0.2–1)
BILIRUB UR QL: NEGATIVE
BUN SERPL-MCNC: 9 MG/DL (ref 6–20)
BUN/CREAT SERPL: 15 (ref 12–20)
CA-I BLD-MCNC: 9.2 MG/DL (ref 8.5–10.1)
CHLORIDE SERPL-SCNC: 102 MMOL/L (ref 97–108)
CO2 SERPL-SCNC: 27 MMOL/L (ref 21–32)
COLOR UR: YELLOW
CREAT SERPL-MCNC: 0.61 MG/DL (ref 0.55–1.02)
DIFFERENTIAL METHOD BLD: ABNORMAL
EOSINOPHIL # BLD: 0.2 K/UL (ref 0–0.4)
EOSINOPHIL NFR BLD: 2 % (ref 0–7)
EPITH CASTS URNS QL MICRO: ABNORMAL /LPF
ERYTHROCYTE [DISTWIDTH] IN BLOOD BY AUTOMATED COUNT: 19.2 % (ref 11.5–14.5)
GLOBULIN SER CALC-MCNC: 4.4 G/DL (ref 2–4)
GLUCOSE SERPL-MCNC: 66 MG/DL (ref 65–100)
GLUCOSE UR STRIP.AUTO-MCNC: NEGATIVE MG/DL
HCG SERPL-ACNC: ABNORMAL MIU/ML (ref 0–6)
HCG UR QL: POSITIVE
HCT VFR BLD AUTO: 31.5 % (ref 35–47)
HGB BLD-MCNC: 9.8 G/DL (ref 11.5–16)
HGB UR QL STRIP: NEGATIVE
IMM GRANULOCYTES # BLD AUTO: 0 K/UL (ref 0–0.04)
IMM GRANULOCYTES NFR BLD AUTO: 0 % (ref 0–0.5)
KETONES UR QL STRIP.AUTO: NEGATIVE MG/DL
LEUKOCYTE ESTERASE UR QL STRIP.AUTO: ABNORMAL
LYMPHOCYTES # BLD: 2.2 K/UL (ref 0.8–3.5)
LYMPHOCYTES NFR BLD: 19 % (ref 12–49)
MCH RBC QN AUTO: 22.7 PG (ref 26–34)
MCHC RBC AUTO-ENTMCNC: 31.1 G/DL (ref 30–36.5)
MCV RBC AUTO: 72.9 FL (ref 80–99)
MONOCYTES # BLD: 0.7 K/UL (ref 0–1)
MONOCYTES NFR BLD: 6 % (ref 5–13)
MUCOUS THREADS URNS QL MICRO: ABNORMAL /LPF
NEUTS SEG # BLD: 8.4 K/UL (ref 1.8–8)
NEUTS SEG NFR BLD: 73 % (ref 32–75)
NITRITE UR QL STRIP.AUTO: NEGATIVE
PH UR STRIP: 6 (ref 5–8)
PLATELET # BLD AUTO: 269 K/UL (ref 150–400)
PMV BLD AUTO: 10.5 FL (ref 8.9–12.9)
POTASSIUM SERPL-SCNC: 3.5 MMOL/L (ref 3.5–5.1)
PROT SERPL-MCNC: 7.3 G/DL (ref 6.4–8.2)
PROT UR STRIP-MCNC: NEGATIVE MG/DL
RBC # BLD AUTO: 4.32 M/UL (ref 3.8–5.2)
RBC #/AREA URNS HPF: ABNORMAL /HPF (ref 0–5)
SODIUM SERPL-SCNC: 138 MMOL/L (ref 136–145)
SP GR UR REFRACTOMETRY: 1.03 (ref 1–1.03)
URINE CULTURE IF INDICATED: ABNORMAL
UROBILINOGEN UR QL STRIP.AUTO: 0.1 EU/DL (ref 0.2–1)
WBC # BLD AUTO: 11.6 K/UL (ref 3.6–11)
WBC URNS QL MICRO: ABNORMAL /HPF (ref 0–4)
YEAST URNS QL MICRO: PRESENT

## 2024-11-07 PROCEDURE — 80053 COMPREHEN METABOLIC PANEL: CPT

## 2024-11-07 PROCEDURE — 84702 CHORIONIC GONADOTROPIN TEST: CPT

## 2024-11-07 PROCEDURE — 85025 COMPLETE CBC W/AUTO DIFF WBC: CPT

## 2024-11-07 PROCEDURE — 81025 URINE PREGNANCY TEST: CPT

## 2024-11-07 PROCEDURE — 86901 BLOOD TYPING SEROLOGIC RH(D): CPT

## 2024-11-07 PROCEDURE — 6370000000 HC RX 637 (ALT 250 FOR IP)

## 2024-11-07 PROCEDURE — 99284 EMERGENCY DEPT VISIT MOD MDM: CPT

## 2024-11-07 PROCEDURE — 81001 URINALYSIS AUTO W/SCOPE: CPT

## 2024-11-07 PROCEDURE — 86900 BLOOD TYPING SEROLOGIC ABO: CPT

## 2024-11-07 PROCEDURE — 76815 OB US LIMITED FETUS(S): CPT

## 2024-11-07 RX ORDER — CLOTRIMAZOLE 1 %
CREAM WITH APPLICATOR VAGINAL
Qty: 45 G | Refills: 0 | Status: SHIPPED | OUTPATIENT
Start: 2024-11-07 | End: 2024-11-14

## 2024-11-07 RX ORDER — ACETAMINOPHEN 500 MG
1000 TABLET ORAL
Status: COMPLETED | OUTPATIENT
Start: 2024-11-07 | End: 2024-11-07

## 2024-11-07 RX ADMIN — ACETAMINOPHEN 1000 MG: 500 TABLET ORAL at 22:02

## 2024-11-07 ASSESSMENT — PAIN DESCRIPTION - LOCATION: LOCATION: ABDOMEN

## 2024-11-07 ASSESSMENT — PAIN - FUNCTIONAL ASSESSMENT: PAIN_FUNCTIONAL_ASSESSMENT: 0-10

## 2024-11-07 ASSESSMENT — PAIN SCALES - GENERAL: PAINLEVEL_OUTOF10: 5

## 2024-11-07 ASSESSMENT — PAIN DESCRIPTION - ORIENTATION: ORIENTATION: LOWER;RIGHT

## 2024-11-07 NOTE — ED TRIAGE NOTES
Lower abd pain, mainly on right side and can feel a lump there for about 3 months, c/o diarrhea, nausea, headache, tiredness

## 2024-11-07 NOTE — ED PROVIDER NOTES
[facility] EMERGENCY DEPT  EMERGENCY DEPARTMENT HISTORY AND PHYSICAL EXAM      Date: 2024  Patient Name: Steffany Lyles  MRN: [id number]  Birthdate 1990  Date of evaluation: 2024  Provider: CARROLL Gomez   Note Started: 6:47 PM EST 24    HISTORY OF PRESENT ILLNESS     Chief Complaint   Patient presents with    Lower Abdominal Pain    Headache    Nausea       History Provided By: Patient    HPI: Steffany Lyles is a 34 y.o. female with past medical history as listed and reviewed below who presents to the ED complaining of lower abdominal pain, headache, nausea, vomiting, and diarrhea x 3 months. She also reports a \"lump\" in her lower abdomen. She denies any fevers/chills, chest pain, dysuria, or vaginal bleeding. FDLMP was 2024. She has had 5  sections in the past.     PAST MEDICAL HISTORY   Past Medical History:  Past Medical History:   Diagnosis Date    Abnormal Papanicolaou smear of cervix     colpo    Acquired hypothyroidism     HX hypothyroidism since 14, not on medication because she cannot find MD    Anxiety and depression     Asthma     Borderline schizophrenia (HCC)     Gastritis        Past Surgical History:  Past Surgical History:   Procedure Laterality Date     DELIVERY ONLY       - , , , 2017    LEEP  2021    WISDOM TOOTH EXTRACTION         Family History:  History reviewed. No pertinent family history.    Social History:  Social History     Tobacco Use    Smoking status: Former     Current packs/day: 0.00     Types: Cigarettes     Quit date: 2020     Years since quittin.8    Smokeless tobacco: Never   Substance Use Topics    Alcohol use: Never     Alcohol/week: 2.0 - 3.0 standard drinks of alcohol    Drug use: Never       Allergies:  Allergies   Allergen Reactions    Latex Hives    Apple Hives    Ibuprofen Hives       PCP: Serenity Guerrero MD    Current Meds:   No current facility-administered medications for this encounter.

## 2024-11-08 LAB
ABO + RH BLD: NORMAL
BLOOD BANK CMNT PATIENT-IMP: NORMAL

## 2025-01-21 LAB
ABO, EXTERNAL RESULT: NORMAL
HEP B, EXTERNAL RESULT: NEGATIVE
HIV, EXTERNAL RESULT: NORMAL
RH FACTOR, EXTERNAL RESULT: POSITIVE
RUBELLA TITER, EXTERNAL RESULT: NORMAL
T. PALLIDUM (SYPHILIS) ANTIBODY, EXTERNAL RESULT: NORMAL

## 2025-02-25 ENCOUNTER — HOSPITAL ENCOUNTER (EMERGENCY)
Facility: HOSPITAL | Age: 35
Discharge: HOME | End: 2025-02-25
Attending: STUDENT IN AN ORGANIZED HEALTH CARE EDUCATION/TRAINING PROGRAM
Payer: MEDICAID

## 2025-02-25 VITALS
OXYGEN SATURATION: 99 % | TEMPERATURE: 98.9 F | RESPIRATION RATE: 20 BRPM | BODY MASS INDEX: 46.82 KG/M2 | SYSTOLIC BLOOD PRESSURE: 115 MMHG | DIASTOLIC BLOOD PRESSURE: 72 MMHG | HEIGHT: 61 IN | HEART RATE: 82 BPM | WEIGHT: 248 LBS

## 2025-02-25 DIAGNOSIS — R11.0 NAUSEA: ICD-10-CM

## 2025-02-25 DIAGNOSIS — R10.9 ABDOMINAL PAIN AFFECTING PREGNANCY: Primary | ICD-10-CM

## 2025-02-25 DIAGNOSIS — O26.899 ABDOMINAL PAIN AFFECTING PREGNANCY: Primary | ICD-10-CM

## 2025-02-25 LAB
APPEARANCE UR: CLEAR
BACTERIA URNS QL MICRO: NEGATIVE /HPF
BILIRUB UR QL: NEGATIVE
COLOR UR: YELLOW
EPITH CASTS URNS QL MICRO: ABNORMAL /LPF
FLUAV RNA SPEC QL NAA+PROBE: NOT DETECTED
FLUBV RNA SPEC QL NAA+PROBE: NOT DETECTED
GLUCOSE UR STRIP.AUTO-MCNC: NEGATIVE MG/DL
HGB UR QL STRIP: NEGATIVE
KETONES UR QL STRIP.AUTO: NEGATIVE MG/DL
LEUKOCYTE ESTERASE UR QL STRIP.AUTO: NEGATIVE
NITRITE UR QL STRIP.AUTO: NEGATIVE
PH UR STRIP: 6.5 (ref 5–8)
PROT UR STRIP-MCNC: NEGATIVE MG/DL
RBC #/AREA URNS HPF: ABNORMAL /HPF (ref 0–5)
SARS-COV-2 RNA RESP QL NAA+PROBE: NOT DETECTED
SP GR UR REFRACTOMETRY: 1.02 (ref 1–1.03)
UROBILINOGEN UR QL STRIP.AUTO: 0.1 EU/DL (ref 0.2–1)
WBC URNS QL MICRO: ABNORMAL /HPF (ref 0–4)

## 2025-02-25 PROCEDURE — 87636 SARSCOV2 & INF A&B AMP PRB: CPT

## 2025-02-25 PROCEDURE — 81001 URINALYSIS AUTO W/SCOPE: CPT

## 2025-02-25 PROCEDURE — 99283 EMERGENCY DEPT VISIT LOW MDM: CPT

## 2025-02-25 PROCEDURE — 6370000000 HC RX 637 (ALT 250 FOR IP): Performed by: STUDENT IN AN ORGANIZED HEALTH CARE EDUCATION/TRAINING PROGRAM

## 2025-02-25 RX ORDER — ONDANSETRON 4 MG/1
4 TABLET, ORALLY DISINTEGRATING ORAL ONCE
Status: COMPLETED | OUTPATIENT
Start: 2025-02-25 | End: 2025-02-25

## 2025-02-25 RX ORDER — ACETAMINOPHEN 500 MG
1000 TABLET ORAL
Status: COMPLETED | OUTPATIENT
Start: 2025-02-25 | End: 2025-02-25

## 2025-02-25 RX ORDER — ONDANSETRON 4 MG/1
4 TABLET, ORALLY DISINTEGRATING ORAL 3 TIMES DAILY PRN
Qty: 10 TABLET | Refills: 0 | Status: SHIPPED | OUTPATIENT
Start: 2025-02-25

## 2025-02-25 RX ADMIN — ONDANSETRON 4 MG: 4 TABLET, ORALLY DISINTEGRATING ORAL at 19:35

## 2025-02-25 RX ADMIN — ACETAMINOPHEN 1000 MG: 500 TABLET, FILM COATED ORAL at 19:35

## 2025-02-25 ASSESSMENT — PAIN SCALES - GENERAL
PAINLEVEL_OUTOF10: 5
PAINLEVEL_OUTOF10: 6
PAINLEVEL_OUTOF10: 7

## 2025-02-25 ASSESSMENT — PAIN DESCRIPTION - ORIENTATION
ORIENTATION: LEFT;LOWER
ORIENTATION: LOWER

## 2025-02-25 ASSESSMENT — PAIN - FUNCTIONAL ASSESSMENT
PAIN_FUNCTIONAL_ASSESSMENT: 0-10
PAIN_FUNCTIONAL_ASSESSMENT: 0-10

## 2025-02-25 ASSESSMENT — PAIN DESCRIPTION - LOCATION
LOCATION: ABDOMEN;GENERALIZED
LOCATION: ABDOMEN

## 2025-02-25 NOTE — ED TRIAGE NOTES
Lower abdomen and flank pain, Nausea, Diarrhea, and generalized body pain - symptoms for 3 days.   Pain 7/10. Tylenol @ 12pm today.   LMP - July   37-weeks - reports baby seems to be less active.

## 2025-02-26 ENCOUNTER — HOSPITAL ENCOUNTER (OUTPATIENT)
Facility: HOSPITAL | Age: 35
Setting detail: OBSERVATION
Discharge: HOME | End: 2025-02-27
Attending: STUDENT IN AN ORGANIZED HEALTH CARE EDUCATION/TRAINING PROGRAM | Admitting: OBSTETRICS & GYNECOLOGY
Payer: MEDICAID

## 2025-02-26 PROBLEM — O47.03 FALSE LABOR BEFORE 37 COMPLETED WEEKS OF GESTATION IN THIRD TRIMESTER: Status: ACTIVE | Noted: 2025-02-26

## 2025-02-26 PROBLEM — Z3A.36 36 WEEKS GESTATION OF PREGNANCY: Status: ACTIVE | Noted: 2025-02-26

## 2025-02-26 PROBLEM — O34.211 MATERNAL CARE DUE TO LOW TRANSVERSE UTERINE SCAR FROM PREVIOUS CESAREAN DELIVERY: Status: ACTIVE | Noted: 2025-02-26

## 2025-02-26 LAB
ALBUMIN SERPL-MCNC: 2.6 G/DL (ref 3.5–5)
ALBUMIN/GLOB SERPL: 0.7 (ref 1.1–2.2)
ALP SERPL-CCNC: 133 U/L (ref 45–117)
ALT SERPL-CCNC: 20 U/L (ref 12–78)
ANION GAP SERPL CALC-SCNC: 8 MMOL/L (ref 2–12)
AST SERPL-CCNC: 34 U/L (ref 15–37)
BASOPHILS # BLD: 0.03 K/UL (ref 0–0.1)
BASOPHILS NFR BLD: 0.3 % (ref 0–1)
BILIRUB SERPL-MCNC: 0.5 MG/DL (ref 0.2–1)
BUN SERPL-MCNC: 9 MG/DL (ref 6–20)
BUN/CREAT SERPL: 16 (ref 12–20)
CALCIUM SERPL-MCNC: 8.6 MG/DL (ref 8.5–10.1)
CHLORIDE SERPL-SCNC: 108 MMOL/L (ref 97–108)
CO2 SERPL-SCNC: 21 MMOL/L (ref 21–32)
CREAT SERPL-MCNC: 0.57 MG/DL (ref 0.55–1.02)
DIFFERENTIAL METHOD BLD: ABNORMAL
EOSINOPHIL # BLD: 0.13 K/UL (ref 0–0.4)
EOSINOPHIL NFR BLD: 1.1 % (ref 0–7)
ERYTHROCYTE [DISTWIDTH] IN BLOOD BY AUTOMATED COUNT: 18.2 % (ref 11.5–14.5)
GLOBULIN SER CALC-MCNC: 3.9 G/DL (ref 2–4)
GLUCOSE SERPL-MCNC: 101 MG/DL (ref 65–100)
HCT VFR BLD AUTO: 29.8 % (ref 35–47)
HGB BLD-MCNC: 9.1 G/DL (ref 11.5–16)
IMM GRANULOCYTES # BLD AUTO: 0.05 K/UL (ref 0–0.04)
IMM GRANULOCYTES NFR BLD AUTO: 0.4 % (ref 0–0.5)
LYMPHOCYTES # BLD: 1.94 K/UL (ref 0.8–3.5)
LYMPHOCYTES NFR BLD: 16.3 % (ref 12–49)
MAGNESIUM SERPL-MCNC: 1.8 MG/DL (ref 1.6–2.4)
MCH RBC QN AUTO: 21.7 PG (ref 26–34)
MCHC RBC AUTO-ENTMCNC: 30.5 G/DL (ref 30–36.5)
MCV RBC AUTO: 71 FL (ref 80–99)
MONOCYTES # BLD: 0.59 K/UL (ref 0–1)
MONOCYTES NFR BLD: 5 % (ref 5–13)
NEUTS SEG # BLD: 9.13 K/UL (ref 1.8–8)
NEUTS SEG NFR BLD: 76.9 % (ref 32–75)
NRBC # BLD: 0 K/UL (ref 0–0.01)
NRBC BLD-RTO: 0 PER 100 WBC
PLATELET # BLD AUTO: 237 K/UL (ref 150–400)
PMV BLD AUTO: 10.5 FL (ref 8.9–12.9)
POTASSIUM SERPL-SCNC: 4 MMOL/L (ref 3.5–5.1)
PROT SERPL-MCNC: 6.5 G/DL (ref 6.4–8.2)
RBC # BLD AUTO: 4.2 M/UL (ref 3.8–5.2)
SODIUM SERPL-SCNC: 137 MMOL/L (ref 136–145)
WBC # BLD AUTO: 11.9 K/UL (ref 3.6–11)

## 2025-02-26 PROCEDURE — G0378 HOSPITAL OBSERVATION PER HR: HCPCS

## 2025-02-26 PROCEDURE — 80053 COMPREHEN METABOLIC PANEL: CPT

## 2025-02-26 PROCEDURE — 85025 COMPLETE CBC W/AUTO DIFF WBC: CPT

## 2025-02-26 PROCEDURE — 6360000002 HC RX W HCPCS: Performed by: OBSTETRICS & GYNECOLOGY

## 2025-02-26 PROCEDURE — 96372 THER/PROPH/DIAG INJ SC/IM: CPT

## 2025-02-26 PROCEDURE — G0379 DIRECT REFER HOSPITAL OBSERV: HCPCS

## 2025-02-26 PROCEDURE — 96361 HYDRATE IV INFUSION ADD-ON: CPT

## 2025-02-26 PROCEDURE — 96375 TX/PRO/DX INJ NEW DRUG ADDON: CPT

## 2025-02-26 PROCEDURE — 36415 COLL VENOUS BLD VENIPUNCTURE: CPT

## 2025-02-26 PROCEDURE — 2580000003 HC RX 258: Performed by: OBSTETRICS & GYNECOLOGY

## 2025-02-26 PROCEDURE — 83735 ASSAY OF MAGNESIUM: CPT

## 2025-02-26 PROCEDURE — 96374 THER/PROPH/DIAG INJ IV PUSH: CPT

## 2025-02-26 RX ORDER — MORPHINE SULFATE 10 MG/ML
10 INJECTION, SOLUTION INTRAMUSCULAR; INTRAVENOUS ONCE
Status: COMPLETED | OUTPATIENT
Start: 2025-02-27 | End: 2025-02-26

## 2025-02-26 RX ORDER — PROCHLORPERAZINE EDISYLATE 5 MG/ML
10 INJECTION INTRAMUSCULAR; INTRAVENOUS EVERY 6 HOURS PRN
Status: DISCONTINUED | OUTPATIENT
Start: 2025-02-26 | End: 2025-02-27 | Stop reason: HOSPADM

## 2025-02-26 RX ORDER — FERROUS SULFATE 300 MG/5ML
300 LIQUID (ML) ORAL 2 TIMES DAILY
COMMUNITY

## 2025-02-26 RX ORDER — ONDANSETRON 2 MG/ML
4 INJECTION INTRAMUSCULAR; INTRAVENOUS EVERY 6 HOURS PRN
Status: DISCONTINUED | OUTPATIENT
Start: 2025-02-26 | End: 2025-02-27 | Stop reason: HOSPADM

## 2025-02-26 RX ORDER — ACETAMINOPHEN 500 MG
1000 TABLET ORAL EVERY 8 HOURS PRN
Status: DISCONTINUED | OUTPATIENT
Start: 2025-02-26 | End: 2025-02-27 | Stop reason: HOSPADM

## 2025-02-26 RX ORDER — SODIUM CHLORIDE, SODIUM LACTATE, POTASSIUM CHLORIDE, AND CALCIUM CHLORIDE .6; .31; .03; .02 G/100ML; G/100ML; G/100ML; G/100ML
1000 INJECTION, SOLUTION INTRAVENOUS ONCE
Status: COMPLETED | OUTPATIENT
Start: 2025-02-26 | End: 2025-02-26

## 2025-02-26 RX ADMIN — SODIUM CHLORIDE, POTASSIUM CHLORIDE, SODIUM LACTATE AND CALCIUM CHLORIDE 1000 ML: 600; 310; 30; 20 INJECTION, SOLUTION INTRAVENOUS at 21:50

## 2025-02-26 RX ADMIN — PROCHLORPERAZINE EDISYLATE 10 MG: 5 INJECTION INTRAMUSCULAR; INTRAVENOUS at 23:53

## 2025-02-26 RX ADMIN — MORPHINE SULFATE 10 MG: 10 INJECTION INTRAVENOUS at 23:53

## 2025-02-26 RX ADMIN — ONDANSETRON 4 MG: 2 INJECTION, SOLUTION INTRAMUSCULAR; INTRAVENOUS at 21:54

## 2025-02-26 ASSESSMENT — PAIN DESCRIPTION - DESCRIPTORS: DESCRIPTORS: CRAMPING

## 2025-02-26 ASSESSMENT — PAIN DESCRIPTION - LOCATION: LOCATION: ABDOMEN

## 2025-02-26 ASSESSMENT — PAIN DESCRIPTION - ORIENTATION: ORIENTATION: ANTERIOR

## 2025-02-26 ASSESSMENT — PAIN - FUNCTIONAL ASSESSMENT: PAIN_FUNCTIONAL_ASSESSMENT: ACTIVITIES ARE NOT PREVENTED

## 2025-02-26 ASSESSMENT — PAIN SCALES - GENERAL: PAINLEVEL_OUTOF10: 7

## 2025-02-26 NOTE — ED NOTES
Educated Pt on following up w/ OB if pain continues tomorrow. Next appt is 03/03. Educated on if bleeding, contractions, etc start due to 37wks - follow up w/ location of planning to give birth.

## 2025-02-26 NOTE — ED PROVIDER NOTES
[facility] EMERGENCY DEPT  EMERGENCY DEPARTMENT HISTORY AND PHYSICAL EXAM      Date: 2025  Patient Name: Steffany Lyles  MRN: [id number]  YOB: 1990  Date of evaluation: 2025  Provider: Misael Cervantes MD   Note Started: 7:53 PM EST 25    HISTORY OF PRESENT ILLNESS     Chief Complaint   Patient presents with    Abdominal Pain     Lower/flank.     Diarrhea    37 weeks pregnant       History Provided By: Patient    HPI: Steffany Lyles is a 34 y.o. female approximately 36 weeks pregnant, presents to the Emergency Department for left-sided abdominal pain, nausea, vomiting x 3 days.  Patient denies any fevers chills, denies any chest pain, shortness of breath he is complaining of mild runny nose.  Patient denies any lower abdominal cramping, no vaginal bleeding.  Unremarkable pregnancy thus far.  Is followed up at [facility] [place] [facility].    PAST MEDICAL HISTORY   Past Medical History:  Past Medical History:   Diagnosis Date    Abnormal Papanicolaou smear of cervix     colpo    Acquired hypothyroidism     HX hypothyroidism since 14, not on medication because she cannot find MD    Anxiety and depression     Asthma     Borderline schizophrenia (HCC)     Gastritis        Past Surgical History:  Past Surgical History:   Procedure Laterality Date     DELIVERY ONLY       - , , , 2017    LEEP  2021    WISDOM TOOTH EXTRACTION         Family History:  History reviewed. No pertinent family history.    Social History:  Social History     Tobacco Use    Smoking status: Former     Current packs/day: 0.00     Types: Cigarettes     Quit date: 2020     Years since quittin.1    Smokeless tobacco: Never   Substance Use Topics    Alcohol use: Never     Alcohol/week: 2.0 - 3.0 standard drinks of alcohol    Drug use: Never       Allergies:  Allergies   Allergen Reactions    Latex Hives    Apple Hives    Ibuprofen Hives       PCP: Serenity Guerrero MD    Current

## 2025-02-27 VITALS
HEART RATE: 89 BPM | SYSTOLIC BLOOD PRESSURE: 129 MMHG | RESPIRATION RATE: 18 BRPM | DIASTOLIC BLOOD PRESSURE: 62 MMHG | TEMPERATURE: 98.1 F | OXYGEN SATURATION: 99 %

## 2025-02-27 PROCEDURE — 94761 N-INVAS EAR/PLS OXIMETRY MLT: CPT

## 2025-02-27 PROCEDURE — 76811 OB US DETAILED SNGL FETUS: CPT | Performed by: STUDENT IN AN ORGANIZED HEALTH CARE EDUCATION/TRAINING PROGRAM

## 2025-02-27 PROCEDURE — 99222 1ST HOSP IP/OBS MODERATE 55: CPT | Performed by: STUDENT IN AN ORGANIZED HEALTH CARE EDUCATION/TRAINING PROGRAM

## 2025-02-27 PROCEDURE — 6370000000 HC RX 637 (ALT 250 FOR IP): Performed by: OBSTETRICS & GYNECOLOGY

## 2025-02-27 PROCEDURE — G0378 HOSPITAL OBSERVATION PER HR: HCPCS

## 2025-02-27 RX ADMIN — ACETAMINOPHEN 1000 MG: 500 TABLET ORAL at 12:58

## 2025-02-27 ASSESSMENT — PAIN SCALES - GENERAL: PAINLEVEL_OUTOF10: 4

## 2025-02-27 ASSESSMENT — PAIN DESCRIPTION - DESCRIPTORS: DESCRIPTORS: CRAMPING

## 2025-02-27 ASSESSMENT — PAIN DESCRIPTION - ORIENTATION: ORIENTATION: LOWER

## 2025-02-27 ASSESSMENT — PAIN DESCRIPTION - LOCATION: LOCATION: ABDOMEN

## 2025-02-27 NOTE — PROCEDURES
PATIENT: ALBANIA KEITH   -  : 1990   -  DOS:2025   -  INTERPRETING PROVIDER:Elva Harrell,   Indication  ========    multiple previous c-sections    Method  ======    Transabdominal and transvaginal ultrasound examination. View: suboptimal due to maternal acoustic properties and advanced gestational age    Dating  ======    GA by prior assessment 36 w + 1 d  PANCHO by prior assessment: 3/26/2025  Ultrasound examination on: 2025  GA by U/S based upon: AC, BPD, Femur, HC  GA by U/S 34 w + 6 d  PANCHO by U/S: 2025  Assigned: based on stated PANCHO, selected on 2025  Assigned GA 36 w + 1 d  Assigned PANCHO: 3/26/2025    Fetal Growth Overview  =================    Exam date        GA              BPD (mm)          HC (mm)             AC (mm)              FL (mm)          HL (mm)        EFW (g)  2025        36w 1d        85.6     16%        309.1    2%        318.4     50%        67    10%                             2606    26%    Fetal Biometry  ============    Standard  BPD 85.6 mm 34w 4d 16% Hadlock  .3 mm 35w 5d 41% Nico  .1 mm 34w 4d 2% Hadlock  .4 mm 35w 5d 50% Hadlock  Femur 67.0 mm 34w 3d 10% Hadlock  EFW 2,606 g 35w 0d 26% Hadlock  EFW (lb) 5 lb  EFW (oz) 12 oz  EFW by: Hadlock (BPD-HC-AC-FL)  Extended   5.9 mm  Head / Face / Neck  Nasal bone: NOT VISUALIZED  Other Structures   bpm    General Evaluation  ==============    Cardiac activity present.  bpm. Fetal movements: visualized. Presentation: Cephalic  Placenta: Placental site: anterior, appropriate distance from the internal os. Placental edge-to-cervical os distance 7.2 cm  Umbilical cord: Cord vessels: 3 vessel cord. Insertion site: NOT VISUALIZED, centra  Amniotic fluid: Amount of AF: normal. MVP 7.5 cm. PERFECTO 21.2 cm. Q1 7.5 cm, Q2 7.4 cm, Q3 3.7 cm, Q4 2.6 cm    Fetal Anatomy  ===========    Cranium: normal  Choroid plexus: NOT VISUALIZED  Midline falx: normal  Cavum septi

## 2025-02-27 NOTE — CONSULTS
MATERNAL FETAL MEDICINE  INPATIENT CONSULTATION    REQUESTED BY: Vandana Larson MD   DATE OF CONSULT: 25    REASON FOR CONSULTATION: r/o accreta     Steffany Lyles is a 34 y.o. female  at 36w1d.    HPI  Ms. Lyles presented last night with new onset contractions.  Upon arrival she was assessed and not noted to be in labor and her contractions subsided.  This morning an MFM consult was placed to assess placental location and rule out placental accreta.      Patient Active Problem List   Diagnosis    Pregnant    Pregnancy    Post term pregnancy, antepartum    Labor and delivery indication for care or intervention    False labor before 37 completed weeks of gestation in third trimester    Maternal care due to low transverse uterine scar from previous  delivery    36 weeks gestation of pregnancy       Past Medical History:   Diagnosis Date    Abnormal Papanicolaou smear of cervix     colpo    Acquired hypothyroidism     HX hypothyroidism since , not on medication because she cannot find MD    Anemia     Anxiety and depression     Asthma     albuterol inhaler PRN, pt states last used 2024    Borderline schizophrenia (HCC)     Gastritis        Past Surgical History:   Procedure Laterality Date     DELIVERY ONLY       - , , , ,     LEEP  2021    WISDOM TOOTH EXTRACTION         OB History    Para Term  AB Living   9 5 5   3 5   SAB IAB Ectopic Molar Multiple Live Births   3         5      # Outcome Date GA Lbr Franklin/2nd Weight Sex Type Anes PTL Lv   9 Current            8 Term 23 39w4d  3.945 kg (8 lb 11.2 oz) M CS-LTranv Spinal N CARTER   7 Term 16 39w5d  3.35 kg (7 lb 6.2 oz) F   N CARTER   6 Term 14 40w4d  3.19 kg (7 lb 0.5 oz) F   N CARTER   5 Term 13 38w0d  3.118 kg (6 lb 14 oz) F CS-LTranv   CARTER   4 Term 10/19/09 40w0d  3.005 kg (6 lb 10 oz) F    CARTER      Birth Comments: Pt states she was in labor for 2 days, did not  limitations of ultrasound imaging, including inability to exclude all anomalies, have been reviewed with the patient. All questions and concerns addressed.     ASSESSMENT:    MATTHEW is 34 yrs of age,  at 36w 1d.      Admitted to L&D for new onset contractions on  PM   - stable today  - Denies vaginal bleeding, leaking, regular contractions or pain.      Hx of 5 prior CS with anterior placenta   - no acute signs of PAS noted today      Obesity BMI 46  - Rec baseline preE labs   - Del 39.0-39.6   - Weekly ANT until delivery      Acquired hypothyroidism   - not on meds bc not followed by a doc (per chart review)   - Rec TSH and endo consult      Asthma: Discussed that patients with mild or moderate asthma can have excellent maternal and infant outcomes. However, suboptimal control of asthma during pregnancy may be associated with increased maternal or fetal risk including low birth weight and prematurity.   - continue albuterol PRN     No Maternal Fetal Medicine follow up is indicated. We will gladly see your patient as clinically indicated.     Elva Harrell MD   Maternal Fetal Medicine

## 2025-02-27 NOTE — PROGRESS NOTES
Pt arrives to unit from home with c/o diarrhea that started \"a week or two ago\" and n/v that started \"3 days ago\". Pt states she has been having soft stools off and on. Pt states she has not eaten much today beside \"a few french fries\", but pt has been able to drink \"3 bottles of water\". Pt also c/o left lower abdomen pain that she said feels like \"bruising, but it isn't a bruise\" and she states that when she touches that spot on her abdomen, \"it feels hot\". Pt endorses positive fetal movement although she states the movement \"today is a little less than normal\". Pt was tested for covid and flu yesterday; both were negative. Pt denies leakage/loss of fluid, vaginal bleeding, HA, vision changes, or epigastric pain.

## 2025-02-27 NOTE — PROGRESS NOTES
0720 Bedside and Verbal shift change report given to EMMIE Flores RN (oncoming nurse) by YARI Munguia RN (offgoing nurse). Report included the following information Nurse Handoff Report, Index, Adult Overview, Surgery Report, Intake/Output, MAR, and Recent Results.      0802 Neo ALVARADO at bedside to assess pt. Orders received to remove pt from cont. Monitoring and have pt see [facility] today.    1109 pt off unit at [facility] appointment    1246 pt arrives back to unit from [facility]    1341 telephone orders received from Neo ALVARADO for d/c at this time     1415 this RN at bedside reviewing d/c instructions. All questions answered, pt expresses understanding     1424 pt ambulated off unit at this time with FOB

## 2025-02-27 NOTE — PROGRESS NOTES
AntePart[facility] Progress Note    Steffany Alvarezto  36w1d    Patient admitted for observation for ctx at 36.1, ctx dissipated after morphine, pain well controlled       Vitals:  Patient Vitals for the past 24 hrs:   BP Temp Temp src Pulse Resp SpO2   25 0734 (!) 107/58 98.6 °F (37 °C) Oral 85 18 99 %   25 0631 (!) 111/54 98.6 °F (37 °C) Oral 86 19 99 %   25 2353 -- -- -- -- 18 --   250 114/66 -- -- 90 -- 99 %   25 110/68 -- -- 97 -- 100 %   25 138/73 98.5 °F (36.9 °C) Oral 96 19 100 %     Temp (24hrs), Av.6 °F (37 °C), Min:98.5 °F (36.9 °C), Max:98.6 °F (37 °C)    I&O:   No intake/output data recorded.              1901 -  0700  In: 1004.5   Out: -   NST:  rctive   [place]: intermittent, sporadic            Labs:   Recent Results (from the past 24 hour(s))   CBC with Auto Differential    Collection Time: 25  9:33 PM   Result Value Ref Range    WBC 11.9 (H) 3.6 - 11.0 K/uL    RBC 4.20 3.80 - 5.20 M/uL    Hemoglobin 9.1 (L) 11.5 - 16.0 g/dL    Hematocrit 29.8 (L) 35.0 - 47.0 %    MCV 71.0 (L) 80.0 - 99.0 FL    MCH 21.7 (L) 26.0 - 34.0 PG    MCHC 30.5 30.0 - 36.5 g/dL    RDW 18.2 (H) 11.5 - 14.5 %    Platelets 237 150 - 400 K/uL    MPV 10.5 8.9 - 12.9 FL    Nucleated RBCs 0.0 0  WBC    nRBC 0.00 0.00 - 0.01 K/uL    Neutrophils % 76.9 (H) 32.0 - 75.0 %    Lymphocytes % 16.3 12.0 - 49.0 %    Monocytes % 5.0 5.0 - 13.0 %    Eosinophils % 1.1 0.0 - 7.0 %    Basophils % 0.3 0.0 - 1.0 %    Immature Granulocytes % 0.4 0.0 - 0.5 %    Neutrophils Absolute 9.13 (H) 1.80 - 8.00 K/UL    Lymphocytes Absolute 1.94 0.80 - 3.50 K/UL    Monocytes Absolute 0.59 0.00 - 1.00 K/UL    Eosinophils Absolute 0.13 0.00 - 0.40 K/UL    Basophils Absolute 0.03 0.00 - 0.10 K/UL    Immature Granulocytes Absolute 0.05 (H) 0.00 - 0.04 K/UL    Differential Type AUTOMATED     Comprehensive Metabolic Panel    Collection Time: 25  9:33 PM   Result Value Ref Range    Sodium 137 136 -

## 2025-02-27 NOTE — H&P
seconds    Intensity: mild  Fetal Heart Rate:     Baseline: 140  bpm    Variability: Moderate    Accelerations: Present (15 x 15 bpm)    Decelerations: None  Category: 1  Pelvic:    Membranes:Intact   Cervical Exam:     Position Posterior    Condition Soft  Presentation Vertex  Dilation 0 cms    Effacement 60 %     Station -4       Pelvis is adequate for vaginal delivery.  Extremites:   Trace bilateral pedal edema.  Full range of motion.  Neuro:    Alert. Oriented.  CN 2 - 12 intact.  Motor and sensory exam grossly normal.      Prenatal Labs   Lab Results   Component Value Date/Time    ABORH O Positive 11/07/2024 07:20 PM    RUBELLAEXT Immune 10/12/2022 12:00 AM    HBSAGEXT Negative 10/12/2022 12:00 AM    HIVEXT Negative 10/12/2022 12:00 AM    RPREXT Non-Reactive 10/12/2022 12:00 AM    GONNOEXT Negative 10/11/2022 12:00 AM     Recent Results (from the past 12 hour(s))   CBC with Auto Differential    Collection Time: 02/26/25  9:33 PM   Result Value Ref Range    WBC 11.9 (H) 3.6 - 11.0 K/uL    RBC 4.20 3.80 - 5.20 M/uL    Hemoglobin 9.1 (L) 11.5 - 16.0 g/dL    Hematocrit 29.8 (L) 35.0 - 47.0 %    MCV 71.0 (L) 80.0 - 99.0 FL    MCH 21.7 (L) 26.0 - 34.0 PG    MCHC 30.5 30.0 - 36.5 g/dL    RDW 18.2 (H) 11.5 - 14.5 %    Platelets 237 150 - 400 K/uL    MPV 10.5 8.9 - 12.9 FL    Nucleated RBCs 0.0 0  WBC    nRBC 0.00 0.00 - 0.01 K/uL    Neutrophils % 76.9 (H) 32.0 - 75.0 %    Lymphocytes % 16.3 12.0 - 49.0 %    Monocytes % 5.0 5.0 - 13.0 %    Eosinophils % 1.1 0.0 - 7.0 %    Basophils % 0.3 0.0 - 1.0 %    Immature Granulocytes % 0.4 0.0 - 0.5 %    Neutrophils Absolute 9.13 (H) 1.80 - 8.00 K/UL    Lymphocytes Absolute 1.94 0.80 - 3.50 K/UL    Monocytes Absolute 0.59 0.00 - 1.00 K/UL    Eosinophils Absolute 0.13 0.00 - 0.40 K/UL    Basophils Absolute 0.03 0.00 - 0.10 K/UL    Immature Granulocytes Absolute 0.05 (H) 0.00 - 0.04 K/UL    Differential Type AUTOMATED     Comprehensive Metabolic Panel    Collection Time:  25  9:33 PM   Result Value Ref Range    Sodium 137 136 - 145 mmol/L    Potassium 4.0 3.5 - 5.1 mmol/L    Chloride 108 97 - 108 mmol/L    CO2 21 21 - 32 mmol/L    Anion Gap 8 2 - 12 mmol/L    Glucose 101 (H) 65 - 100 mg/dL    BUN 9 6 - 20 MG/DL    Creatinine 0.57 0.55 - 1.02 MG/DL    BUN/Creatinine Ratio 16 12 - 20      Est, Glom Filt Rate >90 >60 ml/min/1.73m2    Calcium 8.6 8.5 - 10.1 MG/DL    Total Bilirubin 0.5 0.2 - 1.0 MG/DL    ALT 20 12 - 78 U/L    AST 34 15 - 37 U/L    Alk Phosphatase 133 (H) 45 - 117 U/L    Total Protein 6.5 6.4 - 8.2 g/dL    Albumin 2.6 (L) 3.5 - 5.0 g/dL    Globulin 3.9 2.0 - 4.0 g/dL    Albumin/Globulin Ratio 0.7 (L) 1.1 - 2.2     Magnesium    Collection Time: 25  9:33 PM   Result Value Ref Range    Magnesium 1.8 1.6 - 2.4 mg/dL         Assessment/Plan:     Principal Problem:    False labor before 37 completed weeks of gestation in third trimester  Active Problems:    Maternal care due to low transverse uterine scar from previous  delivery    36 weeks gestation of pregnancy  Resolved Problems:    * No resolved hospital problems. *      Will observe  Morphine for pain        Librado Pickens MD  2025

## 2025-03-05 LAB — GBS, EXTERNAL RESULT: NEGATIVE

## 2025-03-21 ENCOUNTER — ANESTHESIA (OUTPATIENT)
Facility: HOSPITAL | Age: 35
End: 2025-03-21
Payer: MEDICAID

## 2025-03-21 ENCOUNTER — HOSPITAL ENCOUNTER (INPATIENT)
Facility: HOSPITAL | Age: 35
LOS: 2 days | Discharge: HOME | DRG: 540 | End: 2025-03-23
Attending: STUDENT IN AN ORGANIZED HEALTH CARE EDUCATION/TRAINING PROGRAM | Admitting: STUDENT IN AN ORGANIZED HEALTH CARE EDUCATION/TRAINING PROGRAM
Payer: MEDICAID

## 2025-03-21 ENCOUNTER — ANESTHESIA EVENT (OUTPATIENT)
Facility: HOSPITAL | Age: 35
End: 2025-03-21
Payer: MEDICAID

## 2025-03-21 DIAGNOSIS — G89.18 POSTOPERATIVE PAIN: Primary | ICD-10-CM

## 2025-03-21 PROBLEM — Z3A.39 39 WEEKS GESTATION OF PREGNANCY: Status: ACTIVE | Noted: 2025-03-21

## 2025-03-21 LAB
BASOPHILS # BLD: 0.04 K/UL (ref 0–0.1)
BASOPHILS NFR BLD: 0.3 % (ref 0–1)
DIFFERENTIAL METHOD BLD: ABNORMAL
EOSINOPHIL # BLD: 0.19 K/UL (ref 0–0.4)
EOSINOPHIL NFR BLD: 1.5 % (ref 0–7)
ERYTHROCYTE [DISTWIDTH] IN BLOOD BY AUTOMATED COUNT: 18.8 % (ref 11.5–14.5)
HBV SURFACE AG SER QL: 0.37 INDEX
HBV SURFACE AG SER QL: NEGATIVE
HCT VFR BLD AUTO: 29.5 % (ref 35–47)
HCV AB SER IA-ACNC: 0.1 INDEX
HCV AB SERPL QL IA: NONREACTIVE
HGB BLD-MCNC: 8.9 G/DL (ref 11.5–16)
HISTORY CHECK: NORMAL
HIV1 P24 AG SERPL QL IA: NONREACTIVE
HIV1+2 AB SERPL QL IA: NONREACTIVE
IMM GRANULOCYTES # BLD AUTO: 0.09 K/UL (ref 0–0.04)
IMM GRANULOCYTES NFR BLD AUTO: 0.7 % (ref 0–0.5)
LYMPHOCYTES # BLD: 1.92 K/UL (ref 0.8–3.5)
LYMPHOCYTES NFR BLD: 15.2 % (ref 12–49)
MCH RBC QN AUTO: 21 PG (ref 26–34)
MCHC RBC AUTO-ENTMCNC: 30.2 G/DL (ref 30–36.5)
MCV RBC AUTO: 69.7 FL (ref 80–99)
MONOCYTES # BLD: 0.69 K/UL (ref 0–1)
MONOCYTES NFR BLD: 5.5 % (ref 5–13)
NEUTS SEG # BLD: 9.67 K/UL (ref 1.8–8)
NEUTS SEG NFR BLD: 76.8 % (ref 32–75)
NRBC # BLD: 0.02 K/UL (ref 0–0.01)
NRBC BLD-RTO: 0.2 PER 100 WBC
PLATELET # BLD AUTO: 230 K/UL (ref 150–400)
PMV BLD AUTO: 10.3 FL (ref 8.9–12.9)
RBC # BLD AUTO: 4.23 M/UL (ref 3.8–5.2)
RBC MORPH BLD: ABNORMAL
RBC MORPH BLD: ABNORMAL
WBC # BLD AUTO: 12.6 K/UL (ref 3.6–11)

## 2025-03-21 PROCEDURE — 2500000003 HC RX 250 WO HCPCS: Performed by: NURSE ANESTHETIST, CERTIFIED REGISTERED

## 2025-03-21 PROCEDURE — 3700000001 HC ADD 15 MINUTES (ANESTHESIA): Performed by: STUDENT IN AN ORGANIZED HEALTH CARE EDUCATION/TRAINING PROGRAM

## 2025-03-21 PROCEDURE — 1120000000 HC RM PRIVATE OB

## 2025-03-21 PROCEDURE — G0379 DIRECT REFER HOSPITAL OBSERV: HCPCS

## 2025-03-21 PROCEDURE — 7100000000 HC PACU RECOVERY - FIRST 15 MIN: Performed by: STUDENT IN AN ORGANIZED HEALTH CARE EDUCATION/TRAINING PROGRAM

## 2025-03-21 PROCEDURE — 94761 N-INVAS EAR/PLS OXIMETRY MLT: CPT

## 2025-03-21 PROCEDURE — 86923 COMPATIBILITY TEST ELECTRIC: CPT

## 2025-03-21 PROCEDURE — 2580000003 HC RX 258: Performed by: STUDENT IN AN ORGANIZED HEALTH CARE EDUCATION/TRAINING PROGRAM

## 2025-03-21 PROCEDURE — 3700000000 HC ANESTHESIA ATTENDED CARE: Performed by: STUDENT IN AN ORGANIZED HEALTH CARE EDUCATION/TRAINING PROGRAM

## 2025-03-21 PROCEDURE — 2580000003 HC RX 258: Performed by: NURSE ANESTHETIST, CERTIFIED REGISTERED

## 2025-03-21 PROCEDURE — 85025 COMPLETE CBC W/AUTO DIFF WBC: CPT

## 2025-03-21 PROCEDURE — 6370000000 HC RX 637 (ALT 250 FOR IP): Performed by: ANESTHESIOLOGY

## 2025-03-21 PROCEDURE — G0378 HOSPITAL OBSERVATION PER HR: HCPCS

## 2025-03-21 PROCEDURE — 6370000000 HC RX 637 (ALT 250 FOR IP): Performed by: STUDENT IN AN ORGANIZED HEALTH CARE EDUCATION/TRAINING PROGRAM

## 2025-03-21 PROCEDURE — 88307 TISSUE EXAM BY PATHOLOGIST: CPT

## 2025-03-21 PROCEDURE — 86901 BLOOD TYPING SEROLOGIC RH(D): CPT

## 2025-03-21 PROCEDURE — 6360000002 HC RX W HCPCS: Performed by: NURSE ANESTHETIST, CERTIFIED REGISTERED

## 2025-03-21 PROCEDURE — 2500000003 HC RX 250 WO HCPCS: Performed by: STUDENT IN AN ORGANIZED HEALTH CARE EDUCATION/TRAINING PROGRAM

## 2025-03-21 PROCEDURE — 86850 RBC ANTIBODY SCREEN: CPT

## 2025-03-21 PROCEDURE — 7100000001 HC PACU RECOVERY - ADDTL 15 MIN: Performed by: STUDENT IN AN ORGANIZED HEALTH CARE EDUCATION/TRAINING PROGRAM

## 2025-03-21 PROCEDURE — 6360000002 HC RX W HCPCS: Performed by: STUDENT IN AN ORGANIZED HEALTH CARE EDUCATION/TRAINING PROGRAM

## 2025-03-21 PROCEDURE — 36415 COLL VENOUS BLD VENIPUNCTURE: CPT

## 2025-03-21 PROCEDURE — 86780 TREPONEMA PALLIDUM: CPT

## 2025-03-21 PROCEDURE — 3609079900 HC CESAREAN SECTION: Performed by: STUDENT IN AN ORGANIZED HEALTH CARE EDUCATION/TRAINING PROGRAM

## 2025-03-21 PROCEDURE — 86900 BLOOD TYPING SEROLOGIC ABO: CPT

## 2025-03-21 RX ORDER — TRANEXAMIC ACID 100 MG/ML
INJECTION, SOLUTION INTRAVENOUS
Status: DISCONTINUED | OUTPATIENT
Start: 2025-03-21 | End: 2025-03-21 | Stop reason: SDUPTHER

## 2025-03-21 RX ORDER — MORPHINE SULFATE 1 MG/ML
INJECTION, SOLUTION EPIDURAL; INTRATHECAL; INTRAVENOUS
Status: DISCONTINUED | OUTPATIENT
Start: 2025-03-21 | End: 2025-03-21 | Stop reason: SDUPTHER

## 2025-03-21 RX ORDER — FERROUS SULFATE 325(65) MG
325 TABLET ORAL 2 TIMES DAILY WITH MEALS
Status: DISCONTINUED | OUTPATIENT
Start: 2025-03-21 | End: 2025-03-23 | Stop reason: HOSPADM

## 2025-03-21 RX ORDER — MISOPROSTOL 200 UG/1
400 TABLET ORAL PRN
Status: DISCONTINUED | OUTPATIENT
Start: 2025-03-21 | End: 2025-03-23 | Stop reason: HOSPADM

## 2025-03-21 RX ORDER — SODIUM CHLORIDE 0.9 % (FLUSH) 0.9 %
5-40 SYRINGE (ML) INJECTION EVERY 12 HOURS SCHEDULED
Status: DISCONTINUED | OUTPATIENT
Start: 2025-03-21 | End: 2025-03-21

## 2025-03-21 RX ORDER — ONDANSETRON 4 MG/1
4 TABLET, ORALLY DISINTEGRATING ORAL EVERY 8 HOURS PRN
Status: DISCONTINUED | OUTPATIENT
Start: 2025-03-21 | End: 2025-03-23 | Stop reason: HOSPADM

## 2025-03-21 RX ORDER — HYDRALAZINE HYDROCHLORIDE 20 MG/ML
10 INJECTION INTRAMUSCULAR; INTRAVENOUS ONCE
Status: DISCONTINUED | OUTPATIENT
Start: 2025-03-21 | End: 2025-03-21

## 2025-03-21 RX ORDER — POLYETHYLENE GLYCOL 3350 17 G/17G
17 POWDER, FOR SOLUTION ORAL 2 TIMES DAILY PRN
Status: DISCONTINUED | OUTPATIENT
Start: 2025-03-21 | End: 2025-03-23 | Stop reason: HOSPADM

## 2025-03-21 RX ORDER — POLYETHYLENE GLYCOL 3350 17 G/17G
17 POWDER, FOR SOLUTION ORAL DAILY
Status: DISCONTINUED | OUTPATIENT
Start: 2025-03-21 | End: 2025-03-21

## 2025-03-21 RX ORDER — MISOPROSTOL 200 UG/1
800 TABLET ORAL PRN
Status: DISCONTINUED | OUTPATIENT
Start: 2025-03-21 | End: 2025-03-23 | Stop reason: HOSPADM

## 2025-03-21 RX ORDER — ACETAMINOPHEN 500 MG
1000 TABLET ORAL EVERY 8 HOURS SCHEDULED
Status: DISCONTINUED | OUTPATIENT
Start: 2025-03-21 | End: 2025-03-21

## 2025-03-21 RX ORDER — METHYLERGONOVINE MALEATE 0.2 MG/ML
200 INJECTION INTRAVENOUS PRN
Status: DISCONTINUED | OUTPATIENT
Start: 2025-03-21 | End: 2025-03-23 | Stop reason: HOSPADM

## 2025-03-21 RX ORDER — SODIUM CHLORIDE 9 MG/ML
INJECTION, SOLUTION INTRAVENOUS PRN
Status: DISCONTINUED | OUTPATIENT
Start: 2025-03-21 | End: 2025-03-23 | Stop reason: HOSPADM

## 2025-03-21 RX ORDER — ONDANSETRON 4 MG/1
4 TABLET, ORALLY DISINTEGRATING ORAL EVERY 8 HOURS PRN
Status: DISCONTINUED | OUTPATIENT
Start: 2025-03-21 | End: 2025-03-21

## 2025-03-21 RX ORDER — NALOXONE HYDROCHLORIDE 0.4 MG/ML
INJECTION, SOLUTION INTRAMUSCULAR; INTRAVENOUS; SUBCUTANEOUS PRN
Status: DISCONTINUED | OUTPATIENT
Start: 2025-03-21 | End: 2025-03-23 | Stop reason: HOSPADM

## 2025-03-21 RX ORDER — SWAB
1 SWAB, NON-MEDICATED MISCELLANEOUS DAILY
Status: DISCONTINUED | OUTPATIENT
Start: 2025-03-21 | End: 2025-03-21

## 2025-03-21 RX ORDER — SODIUM CHLORIDE 0.9 % (FLUSH) 0.9 %
5-40 SYRINGE (ML) INJECTION PRN
Status: DISCONTINUED | OUTPATIENT
Start: 2025-03-21 | End: 2025-03-21

## 2025-03-21 RX ORDER — ONDANSETRON 2 MG/ML
4 INJECTION INTRAMUSCULAR; INTRAVENOUS EVERY 6 HOURS PRN
Status: DISCONTINUED | OUTPATIENT
Start: 2025-03-21 | End: 2025-03-21

## 2025-03-21 RX ORDER — SODIUM CHLORIDE, SODIUM LACTATE, POTASSIUM CHLORIDE, AND CALCIUM CHLORIDE .6; .31; .03; .02 G/100ML; G/100ML; G/100ML; G/100ML
1000 INJECTION, SOLUTION INTRAVENOUS ONCE
Status: DISCONTINUED | OUTPATIENT
Start: 2025-03-21 | End: 2025-03-21

## 2025-03-21 RX ORDER — SIMETHICONE 80 MG
80 TABLET,CHEWABLE ORAL EVERY 6 HOURS PRN
Status: DISCONTINUED | OUTPATIENT
Start: 2025-03-21 | End: 2025-03-23 | Stop reason: HOSPADM

## 2025-03-21 RX ORDER — EPHEDRINE SULFATE/0.9% NACL/PF 25 MG/5 ML
SYRINGE (ML) INTRAVENOUS
Status: DISCONTINUED | OUTPATIENT
Start: 2025-03-21 | End: 2025-03-21 | Stop reason: SDUPTHER

## 2025-03-21 RX ORDER — BUPIVACAINE HYDROCHLORIDE 7.5 MG/ML
INJECTION, SOLUTION INTRASPINAL
Status: DISCONTINUED | OUTPATIENT
Start: 2025-03-21 | End: 2025-03-21 | Stop reason: SDUPTHER

## 2025-03-21 RX ORDER — DOCUSATE SODIUM 100 MG/1
100 CAPSULE, LIQUID FILLED ORAL 2 TIMES DAILY PRN
Status: DISCONTINUED | OUTPATIENT
Start: 2025-03-21 | End: 2025-03-23 | Stop reason: HOSPADM

## 2025-03-21 RX ORDER — FENTANYL CITRATE 50 UG/ML
25 INJECTION, SOLUTION INTRAMUSCULAR; INTRAVENOUS EVERY 5 MIN PRN
Status: DISCONTINUED | OUTPATIENT
Start: 2025-03-21 | End: 2025-03-21

## 2025-03-21 RX ORDER — SODIUM CHLORIDE, SODIUM LACTATE, POTASSIUM CHLORIDE, CALCIUM CHLORIDE 600; 310; 30; 20 MG/100ML; MG/100ML; MG/100ML; MG/100ML
INJECTION, SOLUTION INTRAVENOUS
Status: DISCONTINUED | OUTPATIENT
Start: 2025-03-21 | End: 2025-03-21 | Stop reason: SDUPTHER

## 2025-03-21 RX ORDER — SODIUM CHLORIDE 0.9 % (FLUSH) 0.9 %
5-40 SYRINGE (ML) INJECTION PRN
Status: DISCONTINUED | OUTPATIENT
Start: 2025-03-21 | End: 2025-03-23 | Stop reason: HOSPADM

## 2025-03-21 RX ORDER — SODIUM CHLORIDE 9 MG/ML
INJECTION, SOLUTION INTRAVENOUS PRN
Status: DISCONTINUED | OUTPATIENT
Start: 2025-03-21 | End: 2025-03-21

## 2025-03-21 RX ORDER — SWAB
1 SWAB, NON-MEDICATED MISCELLANEOUS DAILY
Status: DISCONTINUED | OUTPATIENT
Start: 2025-03-21 | End: 2025-03-23 | Stop reason: HOSPADM

## 2025-03-21 RX ORDER — OXYCODONE HYDROCHLORIDE 5 MG/1
5 TABLET ORAL EVERY 4 HOURS PRN
Status: DISCONTINUED | OUTPATIENT
Start: 2025-03-21 | End: 2025-03-21

## 2025-03-21 RX ORDER — IBUPROFEN 800 MG/1
800 TABLET, FILM COATED ORAL EVERY 8 HOURS
Status: CANCELLED | OUTPATIENT
Start: 2025-03-22

## 2025-03-21 RX ORDER — DOCUSATE SODIUM 100 MG/1
100 CAPSULE, LIQUID FILLED ORAL 2 TIMES DAILY
Status: DISCONTINUED | OUTPATIENT
Start: 2025-03-21 | End: 2025-03-21

## 2025-03-21 RX ORDER — OXYCODONE HYDROCHLORIDE 5 MG/1
10 TABLET ORAL EVERY 4 HOURS PRN
Status: DISCONTINUED | OUTPATIENT
Start: 2025-03-21 | End: 2025-03-23 | Stop reason: HOSPADM

## 2025-03-21 RX ORDER — SODIUM CHLORIDE, SODIUM LACTATE, POTASSIUM CHLORIDE, CALCIUM CHLORIDE 600; 310; 30; 20 MG/100ML; MG/100ML; MG/100ML; MG/100ML
INJECTION, SOLUTION INTRAVENOUS CONTINUOUS
Status: DISCONTINUED | OUTPATIENT
Start: 2025-03-21 | End: 2025-03-23 | Stop reason: HOSPADM

## 2025-03-21 RX ORDER — OXYCODONE HYDROCHLORIDE 5 MG/1
5 TABLET ORAL
Status: COMPLETED | OUTPATIENT
Start: 2025-03-21 | End: 2025-03-21

## 2025-03-21 RX ORDER — OXYTOCIN 10 [USP'U]/ML
INJECTION, SOLUTION INTRAMUSCULAR; INTRAVENOUS
Status: DISCONTINUED | OUTPATIENT
Start: 2025-03-21 | End: 2025-03-21 | Stop reason: SDUPTHER

## 2025-03-21 RX ORDER — FAMOTIDINE 20 MG/1
20 TABLET, FILM COATED ORAL 2 TIMES DAILY
Status: DISCONTINUED | OUTPATIENT
Start: 2025-03-21 | End: 2025-03-23 | Stop reason: HOSPADM

## 2025-03-21 RX ORDER — DEXAMETHASONE SODIUM PHOSPHATE 4 MG/ML
INJECTION, SOLUTION INTRA-ARTICULAR; INTRALESIONAL; INTRAMUSCULAR; INTRAVENOUS; SOFT TISSUE
Status: DISCONTINUED | OUTPATIENT
Start: 2025-03-21 | End: 2025-03-21 | Stop reason: SDUPTHER

## 2025-03-21 RX ORDER — ACETAMINOPHEN 500 MG
1000 TABLET ORAL EVERY 8 HOURS SCHEDULED
Status: DISCONTINUED | OUTPATIENT
Start: 2025-03-21 | End: 2025-03-23 | Stop reason: HOSPADM

## 2025-03-21 RX ORDER — SIMETHICONE 80 MG
80 TABLET,CHEWABLE ORAL EVERY 6 HOURS PRN
Status: DISCONTINUED | OUTPATIENT
Start: 2025-03-21 | End: 2025-03-21

## 2025-03-21 RX ORDER — SODIUM CHLORIDE, SODIUM LACTATE, POTASSIUM CHLORIDE, CALCIUM CHLORIDE 600; 310; 30; 20 MG/100ML; MG/100ML; MG/100ML; MG/100ML
INJECTION, SOLUTION INTRAVENOUS CONTINUOUS
Status: DISCONTINUED | OUTPATIENT
Start: 2025-03-21 | End: 2025-03-21

## 2025-03-21 RX ORDER — CARBOPROST TROMETHAMINE 250 UG/ML
250 INJECTION, SOLUTION INTRAMUSCULAR PRN
Status: CANCELLED | OUTPATIENT
Start: 2025-03-21

## 2025-03-21 RX ORDER — ONDANSETRON 2 MG/ML
4 INJECTION INTRAMUSCULAR; INTRAVENOUS
Status: DISCONTINUED | OUTPATIENT
Start: 2025-03-21 | End: 2025-03-21

## 2025-03-21 RX ORDER — TRANEXAMIC ACID 10 MG/ML
1000 INJECTION, SOLUTION INTRAVENOUS
Status: DISCONTINUED | OUTPATIENT
Start: 2025-03-21 | End: 2025-03-23 | Stop reason: HOSPADM

## 2025-03-21 RX ORDER — SODIUM CHLORIDE 0.9 % (FLUSH) 0.9 %
5-40 SYRINGE (ML) INJECTION EVERY 12 HOURS SCHEDULED
Status: DISCONTINUED | OUTPATIENT
Start: 2025-03-21 | End: 2025-03-23 | Stop reason: HOSPADM

## 2025-03-21 RX ORDER — OXYCODONE HYDROCHLORIDE 5 MG/1
5 TABLET ORAL EVERY 4 HOURS PRN
Status: DISCONTINUED | OUTPATIENT
Start: 2025-03-21 | End: 2025-03-23 | Stop reason: HOSPADM

## 2025-03-21 RX ORDER — ONDANSETRON 2 MG/ML
4 INJECTION INTRAMUSCULAR; INTRAVENOUS EVERY 6 HOURS PRN
Status: DISCONTINUED | OUTPATIENT
Start: 2025-03-21 | End: 2025-03-23 | Stop reason: HOSPADM

## 2025-03-21 RX ORDER — KETOROLAC TROMETHAMINE 30 MG/ML
30 INJECTION, SOLUTION INTRAMUSCULAR; INTRAVENOUS EVERY 6 HOURS
Status: CANCELLED | OUTPATIENT
Start: 2025-03-21 | End: 2025-03-22

## 2025-03-21 RX ORDER — PHENYLEPHRINE HCL IN 0.9% NACL 0.4MG/10ML
SYRINGE (ML) INTRAVENOUS
Status: DISCONTINUED | OUTPATIENT
Start: 2025-03-21 | End: 2025-03-21 | Stop reason: SDUPTHER

## 2025-03-21 RX ORDER — ONDANSETRON 2 MG/ML
INJECTION INTRAMUSCULAR; INTRAVENOUS
Status: DISCONTINUED | OUTPATIENT
Start: 2025-03-21 | End: 2025-03-21 | Stop reason: SDUPTHER

## 2025-03-21 RX ORDER — PROCHLORPERAZINE EDISYLATE 5 MG/ML
5 INJECTION INTRAMUSCULAR; INTRAVENOUS
Status: DISCONTINUED | OUTPATIENT
Start: 2025-03-21 | End: 2025-03-21

## 2025-03-21 RX ORDER — FAMOTIDINE 10 MG/ML
INJECTION, SOLUTION INTRAVENOUS
Status: DISCONTINUED | OUTPATIENT
Start: 2025-03-21 | End: 2025-03-21 | Stop reason: SDUPTHER

## 2025-03-21 RX ADMIN — FAMOTIDINE 20 MG: 10 INJECTION, SOLUTION INTRAVENOUS at 08:24

## 2025-03-21 RX ADMIN — MORPHINE SULFATE 4.8 MG: 1 INJECTION, SOLUTION EPIDURAL; INTRATHECAL; INTRAVENOUS at 09:32

## 2025-03-21 RX ADMIN — EPHEDRINE SULFATE 5 MG: 5 INJECTION INTRAVENOUS at 09:06

## 2025-03-21 RX ADMIN — SODIUM CHLORIDE, PRESERVATIVE FREE 10 ML: 5 INJECTION INTRAVENOUS at 20:12

## 2025-03-21 RX ADMIN — OXYTOCIN 30 UNITS: 10 INJECTION, SOLUTION INTRAMUSCULAR; INTRAVENOUS at 09:01

## 2025-03-21 RX ADMIN — CEFAZOLIN 3000 MG: 3 INJECTION, POWDER, FOR SOLUTION INTRAVENOUS at 08:30

## 2025-03-21 RX ADMIN — EPHEDRINE SULFATE 10 MG: 5 INJECTION INTRAVENOUS at 08:39

## 2025-03-21 RX ADMIN — BUPIVACAINE HYDROCHLORIDE IN DEXTROSE 1.4 ML: 7.5 INJECTION, SOLUTION SUBARACHNOID at 08:21

## 2025-03-21 RX ADMIN — Medication 40 MCG: at 09:06

## 2025-03-21 RX ADMIN — DEXAMETHASONE SODIUM PHOSPHATE 4 MG: 4 INJECTION INTRA-ARTICULAR; INTRALESIONAL; INTRAMUSCULAR; INTRAVENOUS; SOFT TISSUE at 08:24

## 2025-03-21 RX ADMIN — TRANEXAMIC ACID 1000 MG: 100 INJECTION INTRAVENOUS at 08:38

## 2025-03-21 RX ADMIN — OXYCODONE 5 MG: 5 TABLET ORAL at 20:09

## 2025-03-21 RX ADMIN — MORPHINE SULFATE 0.2 MG: 1 INJECTION, SOLUTION EPIDURAL; INTRATHECAL; INTRAVENOUS at 08:21

## 2025-03-21 RX ADMIN — MORPHINE SULFATE 5 MG: 1 INJECTION, SOLUTION EPIDURAL; INTRATHECAL; INTRAVENOUS at 09:25

## 2025-03-21 RX ADMIN — ACETAMINOPHEN 1000 MG: 500 TABLET ORAL at 15:48

## 2025-03-21 RX ADMIN — Medication 80 MCG: at 08:34

## 2025-03-21 RX ADMIN — OXYCODONE 5 MG: 5 TABLET ORAL at 10:47

## 2025-03-21 RX ADMIN — ONDANSETRON 4 MG: 2 INJECTION, SOLUTION INTRAMUSCULAR; INTRAVENOUS at 08:24

## 2025-03-21 RX ADMIN — OXYCODONE 5 MG: 5 TABLET ORAL at 15:48

## 2025-03-21 RX ADMIN — SODIUM CHLORIDE, POTASSIUM CHLORIDE, SODIUM LACTATE AND CALCIUM CHLORIDE: 600; 310; 30; 20 INJECTION, SOLUTION INTRAVENOUS at 08:11

## 2025-03-21 ASSESSMENT — PAIN DESCRIPTION - DESCRIPTORS
DESCRIPTORS: ACHING;CRAMPING;SORE
DESCRIPTORS: ACHING

## 2025-03-21 ASSESSMENT — PAIN DESCRIPTION - ORIENTATION
ORIENTATION: ANTERIOR;LOWER
ORIENTATION: LOWER

## 2025-03-21 ASSESSMENT — PAIN DESCRIPTION - LOCATION
LOCATION: ABDOMEN
LOCATION: ABDOMEN

## 2025-03-21 ASSESSMENT — PAIN SCALES - GENERAL
PAINLEVEL_OUTOF10: 7
PAINLEVEL_OUTOF10: 6
PAINLEVEL_OUTOF10: 7

## 2025-03-21 ASSESSMENT — PAIN - FUNCTIONAL ASSESSMENT: PAIN_FUNCTIONAL_ASSESSMENT: ACTIVITIES ARE NOT PREVENTED

## 2025-03-21 NOTE — ANESTHESIA POSTPROCEDURE EVALUATION
Department of Anesthesiology  Postprocedure Note    Patient: Steffany Lyles  MRN: [id number]  YOB: 1990  Date of evaluation: 3/21/2025    Procedure Summary       Date: 25 Room / Location: [facility] L&D 02 / [facility] OR    Anesthesia Start: 811 Anesthesia Stop: 949    Procedure:  SECTION (Abdomen) Diagnosis:       Previous  delivery affecting pregnancy      (Previous  delivery affecting pregnancy [O34.219])    Surgeons: Vandana Larson MD Responsible Provider: Marty Staton MD    Anesthesia Type: Spinal ASA Status: 3            Anesthesia Type: Spinal    Poncho Phase I: Poncho Score: 9    Poncho Phase II:      Anesthesia Post Evaluation    Patient location during evaluation: bedside  Patient participation: complete - patient participated  Level of consciousness: awake and alert  Pain score: 1  Airway patency: patent  Nausea & Vomiting: no vomiting  Cardiovascular status: blood pressure returned to baseline  Respiratory status: acceptable  Hydration status: euvolemic  Pain management: satisfactory to patient    No notable events documented.

## 2025-03-21 NOTE — H&P
History & Physical    Name: Steffany Lyles MRN: [id number]  SSN: [id number]    YOB: 1990  Age: 34 y.o.  Sex: female        Subjective:     Estimated Date of Delivery: 3/26/25  OB History          8    Para   5    Term   5            AB   2    Living   5         SAB   2    IAB        Ectopic        Molar        Multiple        Live Births   5                Ms. Lyles is admitted with pregnancy at 39w2d for rpt cs. Pls see prenatal recrods for details     - late to care  - scant care  - hx schizophrenia  - hx cs x 5    Past Medical History:   Diagnosis Date    Abnormal Papanicolaou smear of cervix     colpo    Acquired hypothyroidism     HX hypothyroidism since 14, not on medication because she cannot find MD    Anemia     Anxiety and depression     Asthma     albuterol inhaler PRN, pt states last used 2024    Borderline schizophrenia (HCC)     Gastritis      Past Surgical History:   Procedure Laterality Date     DELIVERY ONLY       - , , , ,     LEEP  2021    WISDOM TOOTH EXTRACTION       Social History     Occupational History    Not on file   Tobacco Use    Smoking status: Former     Current packs/day: 0.00     Types: Cigarettes     Quit date: 2020     Years since quittin.2    Smokeless tobacco: Never   Substance and Sexual Activity    Alcohol use: Never     Alcohol/week: 2.0 - 3.0 standard drinks of alcohol    Drug use: Never    Sexual activity: Yes     Partners: Male     Birth control/protection: None     History reviewed. No pertinent family history.    Allergies   Allergen Reactions    Latex Hives    Apple Hives    Ibuprofen Hives     Prior to Admission medications    Medication Sig Start Date End Date Taking? Authorizing Provider   Prenatal Vit w/Ru-Vibdavwpq-AH (PNV PO) Take by mouth   Yes Wyatt Saravia MD   ferrous Sulfate 300 (60 Fe) MG/5ML solution Take 5 mLs by mouth 2 times daily   Yes Wyatt Saravia MD

## 2025-03-21 NOTE — PROGRESS NOTES
0600- this RN assuming care of pt at this time     0720- Pt abdomen wiped down with CHG wipes    0811- Pt in OR for scheduled . Please see  summary for further details.     0845- This RN injecting 200mL of sterile milk into pts trejo.     0900- Brenda RN assuming care of pt at this time     1010- This RN assuming care of pt at this time    1215- TRANSFER - OUT REPORT:    Verbal report given to Armand SAHNI on Steffany Lyles  being transferred to MIU for routine progression of patient care       Report consisted of patient's Situation, Background, Assessment and   Recommendations(SBAR).     Information from the following report(s) Nurse Handoff Report, Adult Overview, Intake/Output, MAR, and Recent Results was reviewed with the receiving nurse.           Lines:   Peripheral IV 25 Left;Posterior Hand (Active)   Site Assessment Clean, dry & intact 25 0630   Line Status Infusing 25 0630   Phlebitis Assessment No symptoms 25 0630   Infiltration Assessment 0 25 0630   Dressing Status Clean, dry & intact 25 0630   Dressing Type Transparent 25 0630       Peripheral IV 25 Posterior;Right Hand (Active)   Site Assessment Clean, dry & intact 25 0630   Phlebitis Assessment No symptoms 25 0630   Infiltration Assessment 0 25 0630   Dressing Status Clean, dry & intact 25 0630   Dressing Type Transparent 25 0630        Opportunity for questions and clarification was provided.      Patient transported with:  Registered Nurse

## 2025-03-21 NOTE — DISCHARGE SUMMARY
Obstetrical Discharge Summary     Name: Steffany Lyles MRN: [id number]  SSN: [id number]    YOB: 1990  Age: 34 y.o.  Sex: female      Admit Date: 3/21/2025    Discharge Date: 3/23/25     Attending Physician:  Vandana Wood MD     Delivering Physician:  Joyce Wood MD     * Admission Diagnoses:   IUP @ 39w2d  History of 5 prior cesareans  Schizophrenia  Scant prenatal care      * Discharge Diagnoses:   Delivery of a VFI via  dliever ceaean by Vandana Wood MD on 3/21/2025.    Same a above         Additional Diagnoses:      Lab Results   Component Value Date/Time    RUBELLAEXT Immune 10/12/2022 12:00 AM      Immunization History   Administered Date(s) Administered    MMR, PRIORIX, M-M-R II, (age 12m+), SC, 0.5mL 2014    TDaP, ADACEL (age 10y-64y), BOOSTRIX (age 10y+), IM, 0.5mL 2014       * Procedures:   Lysis of adhesions   delivery          * Discharge Condition: good    * Hospital Course: Normal hospital course following the delivery.    * Disposition: Home    Discharge Medications:      Medication List        ASK your doctor about these medications      acetaminophen 325 MG tablet  Commonly known as: TYLENOL     ALBUTEROL IN     ferrous Sulfate 300 (60 Fe) MG/5ML solution     ondansetron 4 MG disintegrating tablet  Commonly known as: ZOFRAN-ODT  Take 1 tablet by mouth 3 times daily as needed for Nausea or Vomiting     PNV PO              * Follow-up Care/Patient Instructions:  Activity: Activity as tolerated  Diet: Regular Diet  Wound Care: As directed  Followup 1w KEENA removal - incision is very high oblique, almost fundal       Signed By:  VANDANA WOOD MD     2025

## 2025-03-21 NOTE — ANESTHESIA PROCEDURE NOTES
Spinal Block    Patient location during procedure: OR  End time: 3/21/2025 8:18 AM  Reason for block: primary anesthetic  Staffing  Performed: resident/CRNA   Anesthesiologist: Marty Staton MD  Resident/CRNA: Piedad Mitchell APRN - CRNA  Performed by: Piedad Mitchell APRN - CRNA  Authorized by: Marty Staton MD    Spinal Block  Patient position: sitting  Prep: Betasept  Patient monitoring: continuous pulse ox, frequent blood pressure checks and oxygen  Approach: midline  Location: L4/L5  Provider prep: mask and sterile gloves  Local infiltration: lidocaine  Needle  Needle type: Pencan   Needle gauge: 25 G  Needle length: 3.5 in  Assessment  Sensory level: T6  Events: cerebrospinal fluid  Swirl obtained: Yes  CSF: clear  Attempts: 1  Hemodynamics: stable  Preanesthetic Checklist  Completed: patient identified, IV checked, site marked, risks and benefits discussed, surgical/procedural consents, equipment checked, pre-op evaluation, timeout performed, anesthesia consent given, oxygen available, monitors applied/VS acknowledged, fire risk safety assessment completed and verbalized and blood product R/B/A discussed and consented

## 2025-03-21 NOTE — OP NOTE
Op Note         Brief Postoperative Note        Patient: Steffany Lyles  YOB: 1990  MRN: [id number]     Date of Procedure: 3/21/2025     Pre-Op Diagnosis Codes:      * Previous  delivery affecting pregnancy [O34.219]     Post-Op Diagnosis: Same, adhesive disease        Procedure(s):   SECTION, Lysis of adhesions     Surgeon(s):  Vandana Larson MD Lucas, Kathryn D, MD     Assistant:  * No surgical staff found *     Anesthesia: Spinal     Estimated Blood Loss (mL): 350 QBL      Complications: None     Specimens:   * No specimens in log *     Implants:  * No implants in log *      Drains:   Urinary Catheter 25 Trejo (Active)         Findings: Extensive adhesions between the rectus (posterior) and the lower uterine segment, I could not see the lower uterine segment at all, bowel adhered to the anterior uterus and fundus, tubes and ovaries not visualized           Comments: The patient was taken to the operating room with all consents signed and placed in chart as in previous note.    She previously desired an opportunistic salpingectomy but declined at the last minute and changed her mind.     2 Spinal anesthesia was placed and found to be adequate. A trejo catheter was placed and found to be draining clear urine to gravity. She was given pre-operative Ancef 2 grams, placed in supine position with a left lateral tilt, and prepped and draped in the normal sterile fashion. Time out was performed. An Allis test was performed and found to be adequate. Pediatricts team awaiting at bedside.     The scalpel was used to make a pfannenstiel incision. Bovie cautery was used to assist with hemostasis. The scalpel was taken down to the level of the fascia, and the subcutaneous fatty tissue was not able to be bluntly  from the fascia. THERE WAS EXTENSIVE SCARRING BTW THE SUBCUTANEOUS TISSUE AND THE FASICA. The fascial incision was extended laterally in both directions with Rodriguez scissors

## 2025-03-21 NOTE — BRIEF OP NOTE
Brief Postoperative Note      Patient: Steffany Lyles  YOB: 1990  MRN: [id number]    Date of Procedure: 3/21/2025    Pre-Op Diagnosis Codes:      * Previous  delivery affecting pregnancy [O34.219]    Post-Op Diagnosis: Same       Procedure(s):   SECTION, Lysis of adhesions    Surgeon(s):  Vandana Wood MD Lucas, Kathryn D, MD    Assistant:  * No surgical staff found *    Anesthesia: Spinal    Estimated Blood Loss (mL): 500    Complications: None    Specimens:   * No specimens in log *    Implants:  * No implants in log *      Drains:   Urinary Catheter 25 Gamez (Active)       Findings: Extensive adhesions between the rectus (posterior) and the lower uterine segment, I could not see the lower uterine segment at all, bowel adhered to the anterior uterus and fundus, tubes and ovaries not visualized       Electronically signed by VANDANA WOOD MD on 3/21/2025 at 9:47 AM

## 2025-03-21 NOTE — ANESTHESIA PRE PROCEDURE
Department of Anesthesiology  Preprocedure Note       Name:  Steffany Llyes   Age:  34 y.o.  :  1990                                          MRN:  [id number]         Date:  3/21/2025      Surgeon: Surgeon(s):  Vandana Larson MD    Procedure: Procedure(s):   SECTION    Medications prior to admission:   Prior to Admission medications    Medication Sig Start Date End Date Taking? Authorizing Provider   Prenatal Vit w/Di-Bsorvclju-NR (PNV PO) Take by mouth   Yes Provider, MD Wyatt   ferrous Sulfate 300 (60 Fe) MG/5ML solution Take 5 mLs by mouth 2 times daily   Yes Provider, MD Wyatt   ondansetron (ZOFRAN-ODT) 4 MG disintegrating tablet Take 1 tablet by mouth 3 times daily as needed for Nausea or Vomiting 25   Misael Cervantes MD   ALBUTEROL IN Inhale 1-2 puffs into the lungs as needed  Patient not taking: Reported on 2025    Automatic Reconciliation, Ar   acetaminophen (TYLENOL) 325 MG tablet Take 2 tablets by mouth every 6 hours as needed  Patient not taking: Reported on 2025    Automatic Reconciliation, Ar       Current medications:    Current Facility-Administered Medications   Medication Dose Route Frequency Provider Last Rate Last Admin   • lactated ringers infusion   IntraVENous Continuous Vandana Larson MD       • lactated ringers bolus 1,000 mL  1,000 mL IntraVENous Once Vandana Larson MD       • oxytocin (PITOCIN) 30 units in 500 mL infusion  87.3 marquis-units/min IntraVENous Continuous PRN Vandana Larson MD        And   • oxytocin (PITOCIN) 10 unit bolus from the bag  10 Units IntraVENous PRN Vandana Larson MD       • ceFAZolin (ANCEF) 3,000 mg in sodium chloride 0.9 % 100 mL (addEASE)  3,000 mg IntraVENous Once Vandana Larson MD           Allergies:    Allergies   Allergen Reactions   • Latex Hives   • Apple Hives   • Ibuprofen Hives       Problem List:    Patient Active Problem List   Diagnosis Code   • Pregnant Z34.90   • Pregnancy Z34.90   • Post term pregnancy,

## 2025-03-22 LAB
ERYTHROCYTE [DISTWIDTH] IN BLOOD BY AUTOMATED COUNT: 19.7 % (ref 11.5–14.5)
HCT VFR BLD AUTO: 29.6 % (ref 35–47)
HGB BLD-MCNC: 8.5 G/DL (ref 11.5–16)
MCH RBC QN AUTO: 21 PG (ref 26–34)
MCHC RBC AUTO-ENTMCNC: 28.7 G/DL (ref 30–36.5)
MCV RBC AUTO: 73.1 FL (ref 80–99)
NRBC # BLD: 0 K/UL (ref 0–0.01)
NRBC BLD-RTO: 0 PER 100 WBC
PLATELET # BLD AUTO: 221 K/UL (ref 150–400)
PMV BLD AUTO: 10.6 FL (ref 8.9–12.9)
RBC # BLD AUTO: 4.05 M/UL (ref 3.8–5.2)
WBC # BLD AUTO: 14.1 K/UL (ref 3.6–11)

## 2025-03-22 PROCEDURE — 85027 COMPLETE CBC AUTOMATED: CPT

## 2025-03-22 PROCEDURE — 1120000000 HC RM PRIVATE OB

## 2025-03-22 PROCEDURE — 36415 COLL VENOUS BLD VENIPUNCTURE: CPT

## 2025-03-22 PROCEDURE — 94761 N-INVAS EAR/PLS OXIMETRY MLT: CPT

## 2025-03-22 PROCEDURE — 6370000000 HC RX 637 (ALT 250 FOR IP): Performed by: STUDENT IN AN ORGANIZED HEALTH CARE EDUCATION/TRAINING PROGRAM

## 2025-03-22 RX ADMIN — DOCUSATE SODIUM 100 MG: 100 CAPSULE, LIQUID FILLED ORAL at 17:52

## 2025-03-22 RX ADMIN — OXYCODONE 10 MG: 5 TABLET ORAL at 14:13

## 2025-03-22 RX ADMIN — ACETAMINOPHEN 1000 MG: 500 TABLET ORAL at 00:37

## 2025-03-22 RX ADMIN — ACETAMINOPHEN 1000 MG: 500 TABLET ORAL at 09:23

## 2025-03-22 RX ADMIN — OXYCODONE 10 MG: 5 TABLET ORAL at 10:13

## 2025-03-22 RX ADMIN — FERROUS SULFATE TAB 325 MG (65 MG ELEMENTAL FE) 325 MG: 325 (65 FE) TAB at 17:52

## 2025-03-22 RX ADMIN — Medication 1 TABLET: at 09:23

## 2025-03-22 RX ADMIN — OXYCODONE 10 MG: 5 TABLET ORAL at 17:56

## 2025-03-22 RX ADMIN — OXYCODONE 5 MG: 5 TABLET ORAL at 00:37

## 2025-03-22 RX ADMIN — ACETAMINOPHEN 1000 MG: 500 TABLET ORAL at 17:52

## 2025-03-22 RX ADMIN — FERROUS SULFATE TAB 325 MG (65 MG ELEMENTAL FE) 325 MG: 325 (65 FE) TAB at 09:23

## 2025-03-22 RX ADMIN — OXYCODONE 10 MG: 5 TABLET ORAL at 06:11

## 2025-03-22 RX ADMIN — OXYCODONE 10 MG: 5 TABLET ORAL at 23:43

## 2025-03-22 RX ADMIN — SIMETHICONE 80 MG: 80 TABLET, CHEWABLE ORAL at 09:23

## 2025-03-22 ASSESSMENT — PAIN DESCRIPTION - ORIENTATION
ORIENTATION: RIGHT
ORIENTATION: ANTERIOR;LOWER
ORIENTATION: RIGHT
ORIENTATION: RIGHT
ORIENTATION: ANTERIOR;LOWER

## 2025-03-22 ASSESSMENT — PAIN DESCRIPTION - LOCATION
LOCATION: ABDOMEN
LOCATION: ABDOMEN;INCISION
LOCATION: ABDOMEN

## 2025-03-22 ASSESSMENT — PAIN SCALES - GENERAL
PAINLEVEL_OUTOF10: 8
PAINLEVEL_OUTOF10: 8
PAINLEVEL_OUTOF10: 7
PAINLEVEL_OUTOF10: 7
PAINLEVEL_OUTOF10: 8
PAINLEVEL_OUTOF10: 8
PAINLEVEL_OUTOF10: 7
PAINLEVEL_OUTOF10: 6

## 2025-03-22 ASSESSMENT — PAIN DESCRIPTION - DESCRIPTORS
DESCRIPTORS: ACHING;CRAMPING;SORE
DESCRIPTORS: SORE
DESCRIPTORS: ACHING
DESCRIPTORS: ACHING;CRAMPING;SORE

## 2025-03-22 NOTE — LACTATION NOTE
H&P reviewed  After examining the patient I find no changes in the patients condition since the H&P had been written      Vitals:    09/29/20 0546   BP: 113/65   Pulse: 89   Resp: 16   Temp: (!) 97 3 °F (36 3 °C)   SpO2: 100%
This note was copied from a baby's chart.  Mother reports that breast feeding is going well. She states that her breasts are starting to fill and she has no questions or concerns at this time. Breast feeding basics and engorgement management was discussed at this time. Mother to call for further breast feeding help if needed. A booklet was provided with warm line, group info, and outpatient lactation resources.    Reviewed breastfeeding basics:  How milk is made and normal  breastfeeding behaviors discussed.  Supply and demand,  stomach size, early feeding cues, skin to skin bonding with comfortable positioning and baby led latch-on reviewed.  How to identify signs of successful breastfeeding sessions reviewed; education on asymetrical latch, signs of effective latching vs shallow, in-effective latching, normal  feeding frequency and duration and expected infant output discussed.  Normal course of breastfeeding discussed including the AAP's recommendation that children receive exclusive breast milk feedings for the first six months of life with breast milk feedings to continue through the first year of life and/or beyond as complimentary table foods are added.  Breastfeeding Booklet and Warm line information provided with discussion.  Discussed typical  weight loss and the importance of pediatrician appointment within 24-48 hours of discharge, at 2 weeks of life and normalcy of requesting pediatric weight checks as needed in between visits.     Engorgement Care Guidelines:  Reviewed how milk is made and normal phases of milk production.  Taught care of engorged breasts - frequent breastfeeding encouraged, cool packs and motrin as tolerated.  Anticipatory guidance shared.    Pt will successfully establish breastfeeding by feeding in response to early feeding cues or wake every 3h, will obtain deep latch, and will keep log of feedings/output.  Taught to BF at hunger cues and or q 2-3 hrs  
will obtain deep latch, and will keep log of feedings/output.  Taught to BF at hunger cues and or q 2-3 hrs and to offer 10-20 drops of hand expressed colostrum at any non-feeds.      Showed mom how to hand express and explained benefits of hand expression for milk production and feeding baby.    Discussed with mother her plan for feeding.  Reviewed the benefits of exclusive breast milk feeding during the hospital stay.   Informed her of the risks of using formula to supplement in the first few days of life as well as the benefits of successful breast milk feeding; referred her to the Breastfeeding booklet about this information.   She acknowledges understanding of information reviewed and states that it is her plan to breastfeed her infant.  Will support her choice and offer additional information as needed.     Reviewed breastfeeding basics:  How milk is made and normal  breastfeeding behaviors discussed.  Supply and demand,  stomach size, early feeding cues, skin to skin bonding with comfortable positioning and baby led latch-on reviewed.  How to identify signs of successful breastfeeding sessions reviewed; education on asymetrical latch, signs of effective latching vs shallow, in-effective latching, normal  feeding frequency and duration and expected infant output discussed.  Normal course of breastfeeding discussed including the AAP's recommendation that children receive exclusive breast milk feedings for the first six months of life with breast milk feedings to continue through the first year of life and/or beyond as complimentary table foods are added.  Breastfeeding Booklet and Warm line information provided with discussion.  Discussed typical  weight loss and the importance of pediatrician appointment within 24-48 hours of discharge, at 2 weeks of life and normalcy of requesting pediatric weight checks as needed in between visits.                   LATCH Documentation  Latch:

## 2025-03-22 NOTE — PROGRESS NOTES
Post-Operative  Day 1    Steffany Lyles         Information for the patient's :  Rafal, Girl Steffany [[id number]]   , Low Transverse Patient doing well without unusual complaints. Pain well controlled with PO pain medication.  Voiding without difficulty.  Ambulating.  Flatus not yet present.  Tolerating regular diet.      Vitals:  BP (!) 121/58   Pulse 86   Temp 97.9 °F (36.6 °C) (Oral)   Resp 18   Ht 1.549 m (5' 1\")   Wt 114.8 kg (253 lb)   LMP 2024 (Approximate)   SpO2 98%   Breastfeeding Unknown   BMI 47.80 kg/m²   Temp (24hrs), Av.8 °F (36.6 °C), Min:97.3 °F (36.3 °C), Max:98.4 °F (36.9 °C)      Last 24hr Input/Output:    Intake/Output Summary (Last 24 hours) at 3/22/2025 1001  Last data filed at 3/22/2025 0008  Gross per 24 hour   Intake --   Output 2693 ml   Net -2693 ml          Exam:       Patient without distress.  Abdomen:soft, expected moderate tenderness, fundus firm @U-2, dressing c/d/i  Lower extremities are no tenderness moderate with edema.    Labs:   Lab Results   Component Value Date/Time    WBC 14.1 2025 05:10 AM    WBC 12.6 2025 05:36 AM    WBC 11.9 2025 09:33 PM    WBC 11.6 2024 07:20 PM    WBC 10.5 2023 03:55 AM    WBC 11.1 2023 09:10 AM    WBC 10.6 02/15/2023 07:59 PM    WBC 10.4 2022 08:46 PM    WBC 7.5 2021 11:03 AM    HGB 8.5 2025 05:10 AM    HGB 8.9 2025 05:36 AM    HGB 9.1 2025 09:33 PM    HGB 9.8 2024 07:20 PM    HGB 8.4 2023 03:55 AM    HGB 10.3 2023 09:10 AM    HGB 10.6 02/15/2023 07:59 PM    HGB 11.0 2022 08:46 PM    HGB 11.6 2021 11:03 AM    HCT 29.6 2025 05:10 AM    HCT 29.5 2025 05:36 AM    HCT 29.8 2025 09:33 PM    HCT 31.5 2024 07:20 PM    HCT 27.9 2023 03:55 AM    HCT 33.9 2023 09:10 AM    HCT 33.7 02/15/2023 07:59 PM    HCT 34.8 2022 08:46 PM    HCT 38.4 2021 11:03 AM     2025 05:10

## 2025-03-23 VITALS
RESPIRATION RATE: 16 BRPM | HEART RATE: 85 BPM | TEMPERATURE: 98.2 F | WEIGHT: 253 LBS | HEIGHT: 61 IN | OXYGEN SATURATION: 97 % | DIASTOLIC BLOOD PRESSURE: 66 MMHG | SYSTOLIC BLOOD PRESSURE: 114 MMHG | BODY MASS INDEX: 47.77 KG/M2

## 2025-03-23 PROCEDURE — 6370000000 HC RX 637 (ALT 250 FOR IP): Performed by: STUDENT IN AN ORGANIZED HEALTH CARE EDUCATION/TRAINING PROGRAM

## 2025-03-23 RX ORDER — OXYCODONE HYDROCHLORIDE 10 MG/1
10 TABLET ORAL EVERY 6 HOURS PRN
Qty: 15 TABLET | Refills: 0 | Status: SHIPPED | OUTPATIENT
Start: 2025-03-23 | End: 2025-03-27

## 2025-03-23 RX ORDER — FERROUS SULFATE 325(65) MG
325 TABLET ORAL 2 TIMES DAILY WITH MEALS
Qty: 60 TABLET | Refills: 3 | Status: SHIPPED | OUTPATIENT
Start: 2025-03-23

## 2025-03-23 RX ADMIN — ACETAMINOPHEN 1000 MG: 500 TABLET ORAL at 10:22

## 2025-03-23 RX ADMIN — OXYCODONE 10 MG: 5 TABLET ORAL at 10:22

## 2025-03-23 RX ADMIN — FERROUS SULFATE TAB 325 MG (65 MG ELEMENTAL FE) 325 MG: 325 (65 FE) TAB at 08:20

## 2025-03-23 RX ADMIN — ACETAMINOPHEN 1000 MG: 500 TABLET ORAL at 02:05

## 2025-03-23 RX ADMIN — OXYCODONE 10 MG: 5 TABLET ORAL at 05:53

## 2025-03-23 RX ADMIN — DOCUSATE SODIUM 100 MG: 100 CAPSULE, LIQUID FILLED ORAL at 08:20

## 2025-03-23 RX ADMIN — Medication 1 TABLET: at 08:20

## 2025-03-23 ASSESSMENT — PAIN DESCRIPTION - ORIENTATION
ORIENTATION: ANTERIOR;LOWER

## 2025-03-23 ASSESSMENT — PAIN SCALES - GENERAL
PAINLEVEL_OUTOF10: 7
PAINLEVEL_OUTOF10: 8
PAINLEVEL_OUTOF10: 7

## 2025-03-23 ASSESSMENT — PAIN DESCRIPTION - DESCRIPTORS
DESCRIPTORS: ACHING;CRAMPING;SORE
DESCRIPTORS: ACHING;CRAMPING;SORE
DESCRIPTORS: SORE;ACHING

## 2025-03-23 ASSESSMENT — PAIN DESCRIPTION - LOCATION
LOCATION: ABDOMEN;INCISION
LOCATION: ABDOMEN
LOCATION: ABDOMEN

## 2025-03-23 NOTE — PROGRESS NOTES
Reviewed discharge instructions with Pt. Pt off unit in stable condition by wheelchair with volunteers for discharge home per Dr. Pham. Pt is aware to follow up in 1 week for KEENA removal. Prescription sent to pharmacy. Pt denies any headache, dizziness, n/v, or pain at this time. Infant in car seat and discharged with mother.

## 2025-03-23 NOTE — DISCHARGE INSTRUCTIONS
Discharge Instructions for  Section    Patient ID:  Steffany Lyles  [id number]  34 y.o.  1990    Continue taking your prenatal vitamins if you are breastfeeding.    Follow-up care is a key part of your treatment and safety. Be sure to keep all your scheduled appointments    Activity  Avoid heavy lifting greater than 10lbs for 2 weeks after your surgery  Pelvic rest for 6 weeks, ie, nothing in your vagina for 6 weeks  (no intercourse, tampons, or douching). No tubs for 6 weeks.  No driving for 2 weeks and until you are no longer taking prescription pain medications and you can put your foot on the break in a hurry    Limit climbing stairs to only when necessary the first 1-2 weeks.  Hold the railing and do not carry your baby up and downstairs at first for safety   You may walk as tolerated, though be care to not over-do it.  Walking will assist in overall healing, decrease constipation and bloating. You'll feel better more quickly with some daily movement.    Diet  Regular diet as tolerated    Wound care  There are several types of incision closures.  If you have metal staples in place when you leave the hospital, please call your doctor’s office to schedule the staple removal as directed at discharge.  If you have steri strips covering your incision, you may remove them as they fall off or be sure to remove them about one week after your surgery.  Removing them in the shower may be easier.  If you have Dermabond, or skin glue, covering your incision, it will fall off slowly in the next few weeks.  You may remove it as it begins to peel off.     Additional wound care  Clear or reddish drainage from your incision is normal.  It's best to leave it open to the air, but if there is drainage, you may cover the incision lightly with gauze, preferably without tape.    Keep the incision clean and dry.    Numbness of the skin at or around your incision is normal and the feeling usually returns gradually.    Call

## 2025-03-23 NOTE — PROGRESS NOTES
Post-Operative  Day 2    Steffany Lyles     Information for the patient's :  Rafal, Girl Steffany [[id number]]   , Low Transverse Patient doing well without unusual complaints. Pain well controlled with PO pain medication.  Tolerating regular diet.  Flatus present.  Ambulating.    Vitals:  /66   Pulse 85   Temp 98.2 °F (36.8 °C) (Oral)   Resp 16   Ht 1.549 m (5' 1\")   Wt 114.8 kg (253 lb)   LMP 2024 (Approximate)   SpO2 97%   Breastfeeding Unknown   BMI 47.80 kg/m²   Temp (24hrs), Av.1 °F (36.7 °C), Min:98.1 °F (36.7 °C), Max:98.2 °F (36.8 °C)        Exam:      Patient without distress.               Abdomen: soft, expected tenderness, fundus firm                Incision clean, dry and intact               Lower extremities are nontender with edema.    Labs:   Lab Results   Component Value Date/Time    WBC 14.1 2025 05:10 AM    WBC 12.6 2025 05:36 AM    WBC 11.9 2025 09:33 PM    WBC 11.6 2024 07:20 PM    WBC 10.5 2023 03:55 AM    WBC 11.1 2023 09:10 AM    WBC 10.6 02/15/2023 07:59 PM    WBC 10.4 2022 08:46 PM    WBC 7.5 2021 11:03 AM    HGB 8.5 2025 05:10 AM    HGB 8.9 2025 05:36 AM    HGB 9.1 2025 09:33 PM    HGB 9.8 2024 07:20 PM    HGB 8.4 2023 03:55 AM    HGB 10.3 2023 09:10 AM    HGB 10.6 02/15/2023 07:59 PM    HGB 11.0 2022 08:46 PM    HGB 11.6 2021 11:03 AM    HCT 29.6 2025 05:10 AM    HCT 29.5 2025 05:36 AM    HCT 29.8 2025 09:33 PM    HCT 31.5 2024 07:20 PM    HCT 27.9 2023 03:55 AM    HCT 33.9 2023 09:10 AM    HCT 33.7 02/15/2023 07:59 PM    HCT 34.8 2022 08:46 PM    HCT 38.4 2021 11:03 AM     2025 05:10 AM     2025 05:36 AM     2025 09:33 PM     2024 07:20 PM     2023 03:55 AM     2023 09:10 AM     02/15/2023 07:59 PM

## 2025-03-24 LAB
ABO + RH BLD: NORMAL
BLD PROD TYP BPU: NORMAL
BLD PROD TYP BPU: NORMAL
BLOOD BANK DISPENSE STATUS: NORMAL
BLOOD BANK DISPENSE STATUS: NORMAL
BLOOD GROUP ANTIBODIES SERPL: NORMAL
BPU ID: NORMAL
BPU ID: NORMAL
CROSSMATCH RESULT: NORMAL
CROSSMATCH RESULT: NORMAL
SPECIMEN EXP DATE BLD: NORMAL
T PALLIDUM AB SER QL IA: NON REACTIVE
UNIT DIVISION: 0
UNIT DIVISION: 0

## (undated) DEVICE — SOLUTION IRRIG 1000ML 0.9% SOD CHL USP POUR PLAS BTL

## (undated) DEVICE — LLETZ LOOP ELECTRODE, 10MM WIDE X 5MM DEEP, 11.8 CM SHAFT, PURPLE: Brand: MEGADYNE

## (undated) DEVICE — ELECTRODE ES L11CM DIA5MM BALL SHFT RED DISP UTAHLOOP

## (undated) DEVICE — GLOVE SURG SZ 65 THK91MIL LTX FREE SYN POLYISOPRENE

## (undated) DEVICE — BOWL UTIL GRAD 32OZ STRL --

## (undated) DEVICE — SYR 10ML CTRL LR LCK NSAF LF --

## (undated) DEVICE — NEEDLE SPNL 20GA L3.5IN YEL HUB S STL REG WALL FIT STYL W/

## (undated) DEVICE — LLETZ LOOP ELECTRODE, 15MM WIDE X 12MM DEEP, 11.8 CM SHAFT, GREEN: Brand: MEGADYNE

## (undated) DEVICE — LLETZ LOOP ELECTRODE, 20MM WIDE X 15MM DEEP, 11.8 CM SHAFT, BLUE: Brand: MEGADYNE

## (undated) DEVICE — PENCIL ES L3M BTTN SWCH S STL HEX LOK BLDE ELECTRD HOLSTER

## (undated) DEVICE — REM POLYHESIVE ADULT PATIENT RETURN ELECTRODE: Brand: VALLEYLAB

## (undated) DEVICE — TUBING, SUCTION, 1/4" X 12', STRAIGHT: Brand: MEDLINE

## (undated) DEVICE — PERI GYN-SFMC: Brand: MEDLINE INDUSTRIES, INC.